# Patient Record
Sex: FEMALE | Race: WHITE | Employment: UNEMPLOYED | URBAN - METROPOLITAN AREA
[De-identification: names, ages, dates, MRNs, and addresses within clinical notes are randomized per-mention and may not be internally consistent; named-entity substitution may affect disease eponyms.]

---

## 2017-06-21 RX ORDER — VALSARTAN AND HYDROCHLOROTHIAZIDE 80; 12.5 MG/1; MG/1
TABLET, FILM COATED ORAL
Qty: 90 TABLET | Refills: 0 | Status: SHIPPED | OUTPATIENT
Start: 2017-06-21 | End: 2017-08-28 | Stop reason: SDUPTHER

## 2017-08-28 ENCOUNTER — OFFICE VISIT (OUTPATIENT)
Dept: FAMILY MEDICINE CLINIC | Age: 52
End: 2017-08-28

## 2017-08-28 VITALS
DIASTOLIC BLOOD PRESSURE: 85 MMHG | HEIGHT: 67 IN | BODY MASS INDEX: 33.59 KG/M2 | HEART RATE: 70 BPM | OXYGEN SATURATION: 98 % | WEIGHT: 214 LBS | SYSTOLIC BLOOD PRESSURE: 130 MMHG

## 2017-08-28 DIAGNOSIS — Z13.29 THYROID DISORDER SCREEN: ICD-10-CM

## 2017-08-28 DIAGNOSIS — I10 ESSENTIAL HYPERTENSION: ICD-10-CM

## 2017-08-28 DIAGNOSIS — K63.5 POLYP OF COLON, UNSPECIFIED PART OF COLON, UNSPECIFIED TYPE: ICD-10-CM

## 2017-08-28 DIAGNOSIS — Z00.00 ANNUAL PHYSICAL EXAM: Primary | ICD-10-CM

## 2017-08-28 LAB
A/G RATIO: 2.1 (ref 1.1–2.2)
ALBUMIN SERPL-MCNC: 4.5 G/DL (ref 3.4–5)
ALP BLD-CCNC: 53 U/L (ref 40–129)
ALT SERPL-CCNC: 15 U/L (ref 10–40)
ANION GAP SERPL CALCULATED.3IONS-SCNC: 16 MMOL/L (ref 3–16)
AST SERPL-CCNC: 14 U/L (ref 15–37)
BASOPHILS ABSOLUTE: 0.1 K/UL (ref 0–0.2)
BASOPHILS RELATIVE PERCENT: 1 %
BILIRUB SERPL-MCNC: 0.5 MG/DL (ref 0–1)
BUN BLDV-MCNC: 11 MG/DL (ref 7–20)
CALCIUM SERPL-MCNC: 9.7 MG/DL (ref 8.3–10.6)
CHLORIDE BLD-SCNC: 101 MMOL/L (ref 99–110)
CHOLESTEROL, TOTAL: 196 MG/DL (ref 0–199)
CO2: 26 MMOL/L (ref 21–32)
CREAT SERPL-MCNC: 0.5 MG/DL (ref 0.6–1.1)
EOSINOPHILS ABSOLUTE: 0.3 K/UL (ref 0–0.6)
EOSINOPHILS RELATIVE PERCENT: 5.8 %
GFR AFRICAN AMERICAN: >60
GFR NON-AFRICAN AMERICAN: >60
GLOBULIN: 2.1 G/DL
GLUCOSE BLD-MCNC: 89 MG/DL (ref 70–99)
HCT VFR BLD CALC: 43.7 % (ref 36–48)
HDLC SERPL-MCNC: 57 MG/DL (ref 40–60)
HEMOGLOBIN: 14.6 G/DL (ref 12–16)
LDL CHOLESTEROL CALCULATED: 113 MG/DL
LYMPHOCYTES ABSOLUTE: 2.2 K/UL (ref 1–5.1)
LYMPHOCYTES RELATIVE PERCENT: 37.6 %
MCH RBC QN AUTO: 29.5 PG (ref 26–34)
MCHC RBC AUTO-ENTMCNC: 33.5 G/DL (ref 31–36)
MCV RBC AUTO: 88.1 FL (ref 80–100)
MONOCYTES ABSOLUTE: 0.3 K/UL (ref 0–1.3)
MONOCYTES RELATIVE PERCENT: 6 %
NEUTROPHILS ABSOLUTE: 2.9 K/UL (ref 1.7–7.7)
NEUTROPHILS RELATIVE PERCENT: 49.6 %
PDW BLD-RTO: 13.7 % (ref 12.4–15.4)
PLATELET # BLD: 282 K/UL (ref 135–450)
PMV BLD AUTO: 8.6 FL (ref 5–10.5)
POTASSIUM SERPL-SCNC: 4.2 MMOL/L (ref 3.5–5.1)
RBC # BLD: 4.96 M/UL (ref 4–5.2)
SODIUM BLD-SCNC: 143 MMOL/L (ref 136–145)
TOTAL PROTEIN: 6.6 G/DL (ref 6.4–8.2)
TRIGL SERPL-MCNC: 131 MG/DL (ref 0–150)
TSH SERPL DL<=0.05 MIU/L-ACNC: 0.54 UIU/ML (ref 0.27–4.2)
VLDLC SERPL CALC-MCNC: 26 MG/DL
WBC # BLD: 5.8 K/UL (ref 4–11)

## 2017-08-28 PROCEDURE — 36415 COLL VENOUS BLD VENIPUNCTURE: CPT | Performed by: FAMILY MEDICINE

## 2017-08-28 PROCEDURE — 99396 PREV VISIT EST AGE 40-64: CPT | Performed by: FAMILY MEDICINE

## 2017-08-28 RX ORDER — VALSARTAN AND HYDROCHLOROTHIAZIDE 80; 12.5 MG/1; MG/1
TABLET, FILM COATED ORAL
Qty: 90 TABLET | Refills: 3 | Status: SHIPPED | OUTPATIENT
Start: 2017-08-28 | End: 2018-09-07 | Stop reason: SDUPTHER

## 2017-08-28 ASSESSMENT — ENCOUNTER SYMPTOMS
GASTROINTESTINAL NEGATIVE: 1
RESPIRATORY NEGATIVE: 1

## 2017-11-28 ENCOUNTER — HOSPITAL ENCOUNTER (OUTPATIENT)
Dept: MAMMOGRAPHY | Age: 52
Discharge: HOME | End: 2017-11-28
Attending: OBSTETRICS & GYNECOLOGY | Admitting: OBSTETRICS & GYNECOLOGY

## 2017-11-28 DIAGNOSIS — Z12.31 ENCOUNTER FOR SCREENING MAMMOGRAM FOR BREAST CANCER: ICD-10-CM

## 2017-11-30 ENCOUNTER — OFFICE VISIT (OUTPATIENT)
Dept: FAMILY MEDICINE CLINIC | Age: 52
End: 2017-11-30

## 2017-11-30 VITALS
HEIGHT: 65 IN | SYSTOLIC BLOOD PRESSURE: 135 MMHG | OXYGEN SATURATION: 98 % | TEMPERATURE: 97.8 F | DIASTOLIC BLOOD PRESSURE: 85 MMHG | BODY MASS INDEX: 36.15 KG/M2 | HEART RATE: 76 BPM | WEIGHT: 217 LBS

## 2017-11-30 DIAGNOSIS — J01.90 ACUTE NON-RECURRENT SINUSITIS, UNSPECIFIED LOCATION: Primary | ICD-10-CM

## 2017-11-30 DIAGNOSIS — H69.82 ETD (EUSTACHIAN TUBE DYSFUNCTION), LEFT: ICD-10-CM

## 2017-11-30 PROCEDURE — 99213 OFFICE O/P EST LOW 20 MIN: CPT | Performed by: FAMILY MEDICINE

## 2017-11-30 RX ORDER — AMOXICILLIN 500 MG/1
500 CAPSULE ORAL 3 TIMES DAILY
Qty: 30 CAPSULE | Refills: 0 | Status: SHIPPED | OUTPATIENT
Start: 2017-11-30 | End: 2017-12-10

## 2017-11-30 ASSESSMENT — ENCOUNTER SYMPTOMS
NAUSEA: 0
CHEST TIGHTNESS: 1
COUGH: 1
VOMITING: 0
DIARRHEA: 0

## 2017-11-30 ASSESSMENT — PATIENT HEALTH QUESTIONNAIRE - PHQ9
1. LITTLE INTEREST OR PLEASURE IN DOING THINGS: 0
2. FEELING DOWN, DEPRESSED OR HOPELESS: 0
SUM OF ALL RESPONSES TO PHQ9 QUESTIONS 1 & 2: 0
SUM OF ALL RESPONSES TO PHQ QUESTIONS 1-9: 0

## 2018-02-28 ENCOUNTER — OFFICE VISIT (OUTPATIENT)
Dept: FAMILY MEDICINE CLINIC | Age: 53
End: 2018-02-28

## 2018-02-28 VITALS
HEART RATE: 82 BPM | SYSTOLIC BLOOD PRESSURE: 134 MMHG | BODY MASS INDEX: 36.49 KG/M2 | OXYGEN SATURATION: 97 % | HEIGHT: 65 IN | DIASTOLIC BLOOD PRESSURE: 86 MMHG | WEIGHT: 219 LBS

## 2018-02-28 DIAGNOSIS — R22.1 LOCALIZED SWELLING, MASS OR LUMP OF NECK: Primary | ICD-10-CM

## 2018-02-28 DIAGNOSIS — L21.9 SEBORRHEIC DERMATITIS OF SCALP: ICD-10-CM

## 2018-02-28 PROCEDURE — 99213 OFFICE O/P EST LOW 20 MIN: CPT | Performed by: FAMILY MEDICINE

## 2018-02-28 RX ORDER — DOXYCYCLINE HYCLATE 100 MG
100 TABLET ORAL 2 TIMES DAILY
Qty: 20 TABLET | Refills: 0 | Status: SHIPPED | OUTPATIENT
Start: 2018-02-28 | End: 2018-03-10

## 2018-02-28 ASSESSMENT — ENCOUNTER SYMPTOMS: RESPIRATORY NEGATIVE: 1

## 2018-02-28 NOTE — PATIENT INSTRUCTIONS
Deepa Brain was seen today for mass.     Diagnoses and all orders for this visit:    Localized swelling, mass or lump of neck  Rx Doxycycline x 1 week-call if not resolved

## 2018-03-05 ENCOUNTER — OFFICE VISIT (OUTPATIENT)
Dept: DERMATOLOGY | Age: 53
End: 2018-03-05

## 2018-03-05 DIAGNOSIS — L21.9 SEBORRHEIC DERMATITIS OF SCALP: Primary | ICD-10-CM

## 2018-03-05 PROCEDURE — 99242 OFF/OP CONSLTJ NEW/EST SF 20: CPT | Performed by: DERMATOLOGY

## 2018-03-05 RX ORDER — KETOCONAZOLE 20 MG/ML
SHAMPOO TOPICAL
Qty: 1 BOTTLE | Refills: 5 | Status: SHIPPED | OUTPATIENT
Start: 2018-03-05 | End: 2019-04-01 | Stop reason: SDUPTHER

## 2018-03-05 RX ORDER — FLUOCINONIDE TOPICAL SOLUTION USP, 0.05% 0.5 MG/ML
SOLUTION TOPICAL
Qty: 60 ML | Refills: 2 | Status: SHIPPED | OUTPATIENT
Start: 2018-03-05 | End: 2019-06-17 | Stop reason: SDUPTHER

## 2018-03-05 NOTE — PROGRESS NOTES
[facility]) Dermatology  Salud Zelaya [address]      [address]  1965    46 y.o. female     Date of Visit: 3/5/2018    Chief Complaint / Reason for Referral: Rash    I was asked to see this patient by Dr. Nai Du. History of Present Illness:  1. Few yr hx recurrent intermittent severely pruritic eruption of scalp. Denies associated flaking. Denies similar symptoms on eyebrows, around nose, elbows/knees.   -No improvement w/ OTC anti-dandruff shampoos, tea tree oil, HC 2.5% crm.   -Occasionally experiences pruritus of ears.   -Hx of dermatitis affecting hands - has not been active in many years     Review of Systems:  Constitutional: Reports general sense of well-being. Past Medical History, Surgical History, Family History, Medications and Allergies reviewed. Past Medical History:   Diagnosis Date    Hypertension     Nausea & vomiting      Past Surgical History:   Procedure Laterality Date    COLONOSCOPY  1/11/2012    hyperpl    FOOT SURGERY      HYSTERECTOMY      OTHER SURGICAL HISTORY  10/31/11    EXPLORATORY LAPAROTOMY,lysis of adhesions, EXCISION OF PELVIC       Allergies   Allergen Reactions    Cephalexin Nausea And Vomiting    Cephalosporins Nausea And Vomiting    Clindamycin/Lincomycin Nausea And Vomiting    Neosporin [Bacitracin-Neomycin-Polymyxin] Rash    Percocet [Oxycodone-Acetaminophen] Nausea And Vomiting     Outpatient Prescriptions Marked as Taking for the 3/5/18 encounter (Office Visit) with Salud Zelaya MD   Medication Sig Dispense Refill    valsartan-hydrochlorothiazide (DIOVAN-HCT) 80-12.5 MG per tablet TAKE 1 TABLET DAILY 90 tablet 3    Cholecalciferol (VITAMIN D3) 5000 UNITS CAPS Take 1 capsule by mouth daily         Physical Examination     The following were examined and determined to be normal: Psych/Neuro, Head/face, Conjunctivae/eyelids, Gums/teeth/lips, RUE and LUE.     The following were examined and determined to be abnormal: Scalp/hair.     -General: Well-appearing, NAD  1. Crown/vertex scalp - ill-defined mildly scaly focally excoriated/crusted erythematous patches   -EACs, eyebrows, NL folds, elbows - clear     Assessment and Plan     1. Recurrent pruritic eruption of scalp, consistent w/ seborrheic dermatitis  -Ketoconazole shampoo TIW as maintenance  -Lidex soln qd prn flares. Edu re: sparing use, atrophy, striae, hypopigmentation, telangiectasias.

## 2018-03-07 ENCOUNTER — TELEPHONE (OUTPATIENT)
Dept: FAMILY MEDICINE CLINIC | Age: 53
End: 2018-03-07

## 2018-03-07 DIAGNOSIS — R59.0 REACTIVE CERVICAL NODES: Primary | ICD-10-CM

## 2018-03-09 ENCOUNTER — HOSPITAL ENCOUNTER (OUTPATIENT)
Dept: CT IMAGING | Age: 53
Discharge: HOME | End: 2018-03-09
Attending: FAMILY MEDICINE | Admitting: FAMILY MEDICINE

## 2018-03-09 DIAGNOSIS — R59.0 REACTIVE CERVICAL NODES: ICD-10-CM

## 2018-03-09 DIAGNOSIS — R59.0 LOCALIZED ENLARGED LYMPH NODES: ICD-10-CM

## 2018-05-08 ENCOUNTER — OFFICE VISIT (OUTPATIENT)
Dept: FAMILY MEDICINE CLINIC | Age: 53
End: 2018-05-08

## 2018-05-08 VITALS
BODY MASS INDEX: 36.49 KG/M2 | OXYGEN SATURATION: 97 % | WEIGHT: 219 LBS | HEIGHT: 65 IN | RESPIRATION RATE: 18 BRPM | SYSTOLIC BLOOD PRESSURE: 126 MMHG | TEMPERATURE: 97.9 F | DIASTOLIC BLOOD PRESSURE: 88 MMHG | HEART RATE: 75 BPM

## 2018-05-08 DIAGNOSIS — Z00.00 ANNUAL PHYSICAL EXAM: Primary | ICD-10-CM

## 2018-05-08 PROCEDURE — 99396 PREV VISIT EST AGE 40-64: CPT | Performed by: NURSE PRACTITIONER

## 2018-05-08 ASSESSMENT — ENCOUNTER SYMPTOMS
BACK PAIN: 1
HEARTBURN: 1

## 2018-05-23 ENCOUNTER — NURSE ONLY (OUTPATIENT)
Dept: FAMILY MEDICINE CLINIC | Age: 53
End: 2018-05-23

## 2018-05-23 DIAGNOSIS — Z00.00 ANNUAL PHYSICAL EXAM: ICD-10-CM

## 2018-05-23 DIAGNOSIS — Z00.00 ANNUAL PHYSICAL EXAM: Primary | ICD-10-CM

## 2018-05-23 LAB
A/G RATIO: 2.1 (ref 1.1–2.2)
ALBUMIN SERPL-MCNC: 4.6 G/DL (ref 3.4–5)
ALP BLD-CCNC: 49 U/L (ref 40–129)
ALT SERPL-CCNC: 17 U/L (ref 10–40)
ANION GAP SERPL CALCULATED.3IONS-SCNC: 15 MMOL/L (ref 3–16)
AST SERPL-CCNC: 15 U/L (ref 15–37)
BASOPHILS ABSOLUTE: 0.1 K/UL (ref 0–0.2)
BASOPHILS RELATIVE PERCENT: 1.6 %
BILIRUB SERPL-MCNC: 0.5 MG/DL (ref 0–1)
BUN BLDV-MCNC: 10 MG/DL (ref 7–20)
CALCIUM SERPL-MCNC: 9.4 MG/DL (ref 8.3–10.6)
CHLORIDE BLD-SCNC: 102 MMOL/L (ref 99–110)
CHOLESTEROL, TOTAL: 179 MG/DL (ref 0–199)
CO2: 26 MMOL/L (ref 21–32)
CREAT SERPL-MCNC: <0.5 MG/DL (ref 0.6–1.1)
EOSINOPHILS ABSOLUTE: 0.2 K/UL (ref 0–0.6)
EOSINOPHILS RELATIVE PERCENT: 5 %
GFR AFRICAN AMERICAN: >60
GFR NON-AFRICAN AMERICAN: >60
GLOBULIN: 2.2 G/DL
GLUCOSE BLD-MCNC: 100 MG/DL (ref 70–99)
HCT VFR BLD CALC: 42.3 % (ref 36–48)
HDLC SERPL-MCNC: 53 MG/DL (ref 40–60)
HEMOGLOBIN: 14.4 G/DL (ref 12–16)
LDL CHOLESTEROL CALCULATED: 101 MG/DL
LYMPHOCYTES ABSOLUTE: 1.8 K/UL (ref 1–5.1)
LYMPHOCYTES RELATIVE PERCENT: 37.5 %
MCH RBC QN AUTO: 30 PG (ref 26–34)
MCHC RBC AUTO-ENTMCNC: 34 G/DL (ref 31–36)
MCV RBC AUTO: 88.3 FL (ref 80–100)
MONOCYTES ABSOLUTE: 0.4 K/UL (ref 0–1.3)
MONOCYTES RELATIVE PERCENT: 7.8 %
NEUTROPHILS ABSOLUTE: 2.3 K/UL (ref 1.7–7.7)
NEUTROPHILS RELATIVE PERCENT: 48.1 %
PDW BLD-RTO: 13.6 % (ref 12.4–15.4)
PLATELET # BLD: 268 K/UL (ref 135–450)
PMV BLD AUTO: 8.2 FL (ref 5–10.5)
POTASSIUM SERPL-SCNC: 4.2 MMOL/L (ref 3.5–5.1)
RBC # BLD: 4.79 M/UL (ref 4–5.2)
SODIUM BLD-SCNC: 143 MMOL/L (ref 136–145)
TOTAL PROTEIN: 6.8 G/DL (ref 6.4–8.2)
TRIGL SERPL-MCNC: 127 MG/DL (ref 0–150)
TSH SERPL DL<=0.05 MIU/L-ACNC: 0.56 UIU/ML (ref 0.27–4.2)
VLDLC SERPL CALC-MCNC: 25 MG/DL
WBC # BLD: 4.8 K/UL (ref 4–11)

## 2018-05-23 PROCEDURE — 36415 COLL VENOUS BLD VENIPUNCTURE: CPT | Performed by: NURSE PRACTITIONER

## 2018-08-10 ENCOUNTER — OFFICE VISIT (OUTPATIENT)
Dept: FAMILY MEDICINE CLINIC | Age: 53
End: 2018-08-10

## 2018-08-10 VITALS
OXYGEN SATURATION: 97 % | BODY MASS INDEX: 35.28 KG/M2 | HEART RATE: 84 BPM | SYSTOLIC BLOOD PRESSURE: 128 MMHG | DIASTOLIC BLOOD PRESSURE: 88 MMHG | WEIGHT: 212 LBS

## 2018-08-10 DIAGNOSIS — Z11.59 NEED FOR HEPATITIS C SCREENING TEST: ICD-10-CM

## 2018-08-10 DIAGNOSIS — I10 ESSENTIAL HYPERTENSION: Primary | ICD-10-CM

## 2018-08-10 DIAGNOSIS — E55.9 VITAMIN D DEFICIENCY: ICD-10-CM

## 2018-08-10 LAB
HEPATITIS C ANTIBODY INTERPRETATION: NORMAL
VITAMIN D 25-HYDROXY: 73.7 NG/ML

## 2018-08-10 PROCEDURE — 99214 OFFICE O/P EST MOD 30 MIN: CPT | Performed by: FAMILY MEDICINE

## 2018-08-10 PROCEDURE — 36415 COLL VENOUS BLD VENIPUNCTURE: CPT | Performed by: FAMILY MEDICINE

## 2018-08-10 NOTE — PROGRESS NOTES
Chief Complaint   Patient presents with    New Patient     establishing         HPI      46 y.o. female presents today to establish care. Previous patient of Dr. Fuentes Gardner. She is feeling well and has no acute complaints. Hx of HTN on diovan 80-12.5. Reports compliance without medication SE. BP today 128/88. Had hysterectomy 2/2 menorrhagia. Follows with Dr. Kenneth Hong. Gets pap smears annual. Years ago had pelvic mass which was a benign ovarian mass  Patient Active Problem List   Diagnosis    Essential hypertension    Patient overweight    History of colonic diverticulitis    OA (osteoarthritis) of hip    DDD (degenerative disc disease), lumbar    Tendinitis in region of greater trochanter of left femur    Vitamin D deficiency    Eczema    Seborrheic dermatitis of scalp    Polyp of colon    Localized swelling, mass or lump of neck     Past Medical History:   Diagnosis Date    Hypertension     Low back pain     Nausea & vomiting     Ovarian tumor (benign)        Past Surgical History:   Procedure Laterality Date    BREAST LUMPECTOMY Left 1988    COLONOSCOPY  1/11/2012    hyperpl    CYSTOSCOPY  2002/2003    FOOT SURGERY      HYSTERECTOMY      HYSTERECTOMY, TOTAL ABDOMINAL      OTHER SURGICAL HISTORY  10/31/11    EXPLORATORY LAPAROTOMY,lysis of adhesions, EXCISION OF PELVIC     Most Recent Immunizations   Administered Date(s) Administered    Td 01/01/2003    Tdap (Boostrix, Adacel) 06/29/2009        Current Outpatient Prescriptions   Medication Sig Dispense Refill    Multiple Vitamins-Minerals (MULTIVITAMIN ADULT PO) Take by mouth      fluocinonide (LIDEX) 0.05 % external solution Apply sparingly to affected area(s) of scalp qd prn flares. 60 mL 2    ketoconazole (NIZORAL) 2 % shampoo Wash scalp 3 times per week.  1 Bottle 5    valsartan-hydrochlorothiazide (DIOVAN-HCT) 80-12.5 MG per tablet TAKE 1 TABLET DAILY 90 tablet 3    Cholecalciferol (VITAMIN D3) 5000 UNITS CAPS Take 1 capsule by mouth Normal rate, regular rhythm and normal heart sounds. Pulmonary/Chest: Effort normal and breath sounds normal. No respiratory distress. She has no wheezes. Neurological: She is alert and oriented to person, place, and time. Skin: Skin is warm and dry. She is not diaphoretic. Psychiatric: She has a normal mood and affect. Vitals reviewed. ASSESSMENT:   Well Adult, See encounter diagnoses  Marimar was seen today for new patient. Diagnoses and all orders for this visit:    Essential hypertension  Notified her of the recall on valsartan. Provided her with a list of the manufactures on the recall and advised to call her pharmacy and see if hers is on their list and then notify me. Vitamin D deficiency  -     Vitamin D 25 Hydroxy    Need for hepatitis C screening test  -     Hepatitis C Antibody      Billing based on time. 30 minutes spent with the patient, and greater than 50% was spent in counseling       Plan:   See orders and medications filed with this encounter. Return in about 6 months (around 2/10/2019) for or sooner if needed.

## 2018-08-12 ASSESSMENT — ENCOUNTER SYMPTOMS: SHORTNESS OF BREATH: 0

## 2018-09-06 ENCOUNTER — PATIENT MESSAGE (OUTPATIENT)
Dept: FAMILY MEDICINE CLINIC | Age: 53
End: 2018-09-06

## 2018-09-07 RX ORDER — VALSARTAN AND HYDROCHLOROTHIAZIDE 80; 12.5 MG/1; MG/1
TABLET, FILM COATED ORAL
Qty: 90 TABLET | Refills: 3 | Status: SHIPPED | OUTPATIENT
Start: 2018-09-07 | End: 2018-12-17 | Stop reason: SDUPTHER

## 2018-10-22 ENCOUNTER — OFFICE VISIT (OUTPATIENT)
Dept: DERMATOLOGY | Age: 53
End: 2018-10-22
Payer: OTHER GOVERNMENT

## 2018-10-22 DIAGNOSIS — L21.9 SEBORRHEIC DERMATITIS OF SCALP: Primary | ICD-10-CM

## 2018-10-22 DIAGNOSIS — D23.62: ICD-10-CM

## 2018-10-22 PROCEDURE — 99213 OFFICE O/P EST LOW 20 MIN: CPT | Performed by: DERMATOLOGY

## 2018-12-03 ENCOUNTER — HOSPITAL ENCOUNTER (OUTPATIENT)
Dept: WOMENS IMAGING | Age: 53
Discharge: HOME | End: 2018-12-03
Payer: OTHER GOVERNMENT

## 2018-12-03 DIAGNOSIS — Z12.31 ENCOUNTER FOR SCREENING MAMMOGRAM FOR BREAST CANCER: ICD-10-CM

## 2018-12-03 PROCEDURE — 77067 SCR MAMMO BI INCL CAD: CPT

## 2018-12-17 RX ORDER — VALSARTAN AND HYDROCHLOROTHIAZIDE 80; 12.5 MG/1; MG/1
TABLET, FILM COATED ORAL
Qty: 90 TABLET | Refills: 0 | Status: SHIPPED | OUTPATIENT
Start: 2018-12-17 | End: 2019-09-09 | Stop reason: SDUPTHER

## 2018-12-17 NOTE — TELEPHONE ENCOUNTER
From: Yuliana Thurman  Sent: 12/15/2018 11:37 AM EST  Subject: Medication Renewal Request    Yuliana Thurman would like a refill of the following medications:     valsartan-hydrochlorothiazide (DIOVAN-HCT) 80-12.5 MG per tablet Hiral Aden MD]   Patient Comment: My prescription from "[employer] is on a recall. Could you send a 30 day supply prescription to [facility] until [facility] presript[facility] arrives [facility].     Preferred pharmacy: [facility] [address] - P [phone] - F [phone]

## 2019-08-12 ENCOUNTER — OFFICE VISIT (OUTPATIENT)
Dept: FAMILY MEDICINE CLINIC | Age: 54
End: 2019-08-12
Payer: OTHER GOVERNMENT

## 2019-08-12 VITALS
RESPIRATION RATE: 16 BRPM | HEIGHT: 65 IN | OXYGEN SATURATION: 94 % | DIASTOLIC BLOOD PRESSURE: 86 MMHG | HEART RATE: 94 BPM | WEIGHT: 212 LBS | SYSTOLIC BLOOD PRESSURE: 126 MMHG | BODY MASS INDEX: 35.32 KG/M2

## 2019-08-12 DIAGNOSIS — L30.9 DERMATITIS: Primary | ICD-10-CM

## 2019-08-12 DIAGNOSIS — Z23 NEED FOR VACCINATION: ICD-10-CM

## 2019-08-12 PROCEDURE — 99213 OFFICE O/P EST LOW 20 MIN: CPT | Performed by: NURSE PRACTITIONER

## 2019-08-12 PROCEDURE — 90471 IMMUNIZATION ADMIN: CPT | Performed by: NURSE PRACTITIONER

## 2019-08-12 PROCEDURE — 90715 TDAP VACCINE 7 YRS/> IM: CPT | Performed by: NURSE PRACTITIONER

## 2019-08-12 ASSESSMENT — ENCOUNTER SYMPTOMS
NAUSEA: 0
COUGH: 0
DIARRHEA: 0
ABDOMINAL PAIN: 0
SHORTNESS OF BREATH: 0
RHINORRHEA: 0

## 2019-08-12 ASSESSMENT — PATIENT HEALTH QUESTIONNAIRE - PHQ9
SUM OF ALL RESPONSES TO PHQ QUESTIONS 1-9: 0
2. FEELING DOWN, DEPRESSED OR HOPELESS: 0
SUM OF ALL RESPONSES TO PHQ QUESTIONS 1-9: 0
DEPRESSION UNABLE TO ASSESS: PT REFUSES
1. LITTLE INTEREST OR PLEASURE IN DOING THINGS: 0
SUM OF ALL RESPONSES TO PHQ9 QUESTIONS 1 & 2: 0

## 2019-09-09 ENCOUNTER — OFFICE VISIT (OUTPATIENT)
Dept: FAMILY MEDICINE CLINIC | Age: 54
End: 2019-09-09
Payer: OTHER GOVERNMENT

## 2019-09-09 VITALS
TEMPERATURE: 97.7 F | HEART RATE: 73 BPM | DIASTOLIC BLOOD PRESSURE: 64 MMHG | BODY MASS INDEX: 35.85 KG/M2 | OXYGEN SATURATION: 96 % | HEIGHT: 65 IN | WEIGHT: 215.2 LBS | SYSTOLIC BLOOD PRESSURE: 122 MMHG

## 2019-09-09 DIAGNOSIS — I10 ESSENTIAL HYPERTENSION: ICD-10-CM

## 2019-09-09 DIAGNOSIS — Z00.00 ANNUAL PHYSICAL EXAM: Primary | ICD-10-CM

## 2019-09-09 LAB
A/G RATIO: 2.4 (ref 1.1–2.2)
ALBUMIN SERPL-MCNC: 4.7 G/DL (ref 3.4–5)
ALP BLD-CCNC: 49 U/L (ref 40–129)
ALT SERPL-CCNC: 21 U/L (ref 10–40)
ANION GAP SERPL CALCULATED.3IONS-SCNC: 19 MMOL/L (ref 3–16)
AST SERPL-CCNC: 17 U/L (ref 15–37)
BILIRUB SERPL-MCNC: 0.5 MG/DL (ref 0–1)
BUN BLDV-MCNC: 12 MG/DL (ref 7–20)
CALCIUM SERPL-MCNC: 9.8 MG/DL (ref 8.3–10.6)
CHLORIDE BLD-SCNC: 101 MMOL/L (ref 99–110)
CHOLESTEROL, TOTAL: 198 MG/DL (ref 0–199)
CO2: 23 MMOL/L (ref 21–32)
CREAT SERPL-MCNC: 0.5 MG/DL (ref 0.6–1.1)
GFR AFRICAN AMERICAN: >60
GFR NON-AFRICAN AMERICAN: >60
GLOBULIN: 2 G/DL
GLUCOSE BLD-MCNC: 96 MG/DL (ref 70–99)
HDLC SERPL-MCNC: 51 MG/DL (ref 40–60)
LDL CHOLESTEROL CALCULATED: 114 MG/DL
POTASSIUM SERPL-SCNC: 4 MMOL/L (ref 3.5–5.1)
SODIUM BLD-SCNC: 143 MMOL/L (ref 136–145)
TOTAL PROTEIN: 6.7 G/DL (ref 6.4–8.2)
TRIGL SERPL-MCNC: 166 MG/DL (ref 0–150)
VLDLC SERPL CALC-MCNC: 33 MG/DL

## 2019-09-09 PROCEDURE — 99396 PREV VISIT EST AGE 40-64: CPT | Performed by: FAMILY MEDICINE

## 2019-09-09 PROCEDURE — 36415 COLL VENOUS BLD VENIPUNCTURE: CPT | Performed by: FAMILY MEDICINE

## 2019-09-09 RX ORDER — VALSARTAN AND HYDROCHLOROTHIAZIDE 80; 12.5 MG/1; MG/1
TABLET, FILM COATED ORAL
Qty: 90 TABLET | Refills: 1 | Status: SHIPPED | OUTPATIENT
Start: 2019-09-09 | End: 2020-03-09

## 2019-09-09 ASSESSMENT — ENCOUNTER SYMPTOMS
SHORTNESS OF BREATH: 0
VOMITING: 0
ABDOMINAL PAIN: 0
NAUSEA: 0
CONSTIPATION: 0
DIARRHEA: 0

## 2019-09-09 NOTE — PROGRESS NOTES
(DIOVAN-HCT) 80-12.5 MG per tablet; TAKE 1 TABLET DAILY          Plan:   See orders and medications filed with this encounter. The patient is advised to return for routine annual checkups. Encouraged healthy diet, regular exercise and multivitamin daily. Labs checked per orders. Optho visit q 1-2 years. Watch for skin mole changes. Colonoscopy if over age 48 or if family history was discussed. Vaccines ordered today per orders. Return in about 6 months (around 3/9/2020) for hypertension.

## 2019-09-09 NOTE — PATIENT INSTRUCTIONS
Patient Education        Well Visit, Women 48 to 72: Care Instructions  Your Care Instructions    Physical exams can help you stay healthy. Your doctor has checked your overall health and may have suggested ways to take good care of yourself. He or she also may have recommended tests. At home, you can help prevent illness with healthy eating, regular exercise, and other steps. Follow-up care is a key part of your treatment and safety. Be sure to make and go to all appointments, and call your doctor if you are having problems. It's also a good idea to know your test results and keep a list of the medicines you take. How can you care for yourself at home? · Reach and stay at a healthy weight. This will lower your risk for many problems, such as obesity, diabetes, heart disease, and high blood pressure. · Get at least 30 minutes of exercise on most days of the week. Walking is a good choice. You also may want to do other activities, such as running, swimming, cycling, or playing tennis or team sports. · Do not smoke. Smoking can make health problems worse. If you need help quitting, talk to your doctor about stop-smoking programs and medicines. These can increase your chances of quitting for good. · Protect your skin from too much sun. When you're outdoors from 10 a.m. to 4 p.m., stay in the shade or cover up with clothing and a hat with a wide brim. Wear sunglasses that block UV rays. Even when it's cloudy, put broad-spectrum sunscreen (SPF 30 or higher) on any exposed skin. · See a dentist one or two times a year for checkups and to have your teeth cleaned. · Wear a seat belt in the car. Follow your doctor's advice about when to have certain tests. These tests can spot problems early. · Cholesterol. Your doctor will tell you how often to have this done based on your age, family history, or other things that can increase your risk for heart attack and stroke. · Blood pressure.  Have your blood pressure you call for help? Watch closely for changes in your health, and be sure to contact your doctor if you have any problems or symptoms that concern you. Where can you learn more? Go to https://[employer]. org and sign in to your [employer] account. Enter L732 in the [employer] box to learn more about \"Well Visit, Women 50 to 72: Care Instructions. \"     If you do not have an account, please click on the \"Sign Up Now\" link. Current as of: December 13, 2018  Content Version: 12.1  © 2077-8127 Healthwise, Incorporated. Care instructions adapted under license by [facility] ([facility]). If you have questions about a medical condition or this instruction, always ask your healthcare professional. [address] any warranty or liability for your use of this information.

## 2019-10-10 ENCOUNTER — TELEPHONE (OUTPATIENT)
Dept: DERMATOLOGY | Age: 54
End: 2019-10-10

## 2020-01-13 ENCOUNTER — HOSPITAL ENCOUNTER (OUTPATIENT)
Dept: WOMENS IMAGING | Age: 55
Discharge: HOME | End: 2020-01-13
Payer: OTHER GOVERNMENT

## 2020-01-13 ENCOUNTER — TELEPHONE (OUTPATIENT)
Dept: WOMENS IMAGING | Age: 55
End: 2020-01-13

## 2020-01-13 ENCOUNTER — OFFICE VISIT (OUTPATIENT)
Dept: ORTHOPEDIC SURGERY | Age: 55
End: 2020-01-13
Payer: OTHER GOVERNMENT

## 2020-01-13 VITALS
WEIGHT: 215.17 LBS | HEIGHT: 65 IN | DIASTOLIC BLOOD PRESSURE: 80 MMHG | SYSTOLIC BLOOD PRESSURE: 122 MMHG | BODY MASS INDEX: 35.85 KG/M2 | HEART RATE: 72 BPM

## 2020-01-13 PROCEDURE — 99203 OFFICE O/P NEW LOW 30 MIN: CPT | Performed by: PHYSICAL MEDICINE & REHABILITATION

## 2020-01-13 PROCEDURE — 77063 BREAST TOMOSYNTHESIS BI: CPT

## 2020-01-13 NOTE — PROGRESS NOTES
New Patient: SPINE    CHIEF COMPLAINT:    Chief Complaint   Patient presents with    Neck Pain     neck pain x2 years getting worse going into lt shoulder       HISTORY OF PRESENT ILLNESS:                The patient is a 47 y.o. female self-referred for neck pain. I saw her in 2014 for back pain and epidurals with improvement. She reports 2-year history of axial neck pain more on the left side. Worse at night and worse when she turns her head. Some associated occipital headaches. No rating arm pain or numbness. No weakness. She simply want to make sure nothing significant is going on. She reports no recent trauma. No coordination issues  Past Medical History:   Diagnosis Date    Hypertension     Low back pain     Nausea & vomiting     Ovarian tumor (benign)           Pain Assessment  Location of Pain: Neck  Severity of Pain: 4  Quality of Pain: Aching  Duration of Pain: Persistent  Frequency of Pain: Intermittent  Aggravating Factors: Bending, Stretching, Straightening  Limiting Behavior: Yes  Relieving Factors: Rest  Result of Injury: No  Work-Related Injury: No  Are there other pain locations you wish to document?: No    The pain assessment was noted & reviewed in the medical record today.      Current/Past Treatment: None  · Physical Therapy:   · Chiropractic:     · Injection:     Medications:            NSAIDS:             Muscle relaxer:              Steriods:              Neuropathic medications:              Opioids:            Other:   · Surgery/Consult:    Work Status/Functionality:     Past Medical History: Medical history form was reviewed today & scanned into the media tab  Past Medical History:   Diagnosis Date    Hypertension     Low back pain     Nausea & vomiting     Ovarian tumor (benign)       Past Surgical History:     Past Surgical History:   Procedure Laterality Date    BREAST LUMPECTOMY Left 1988    COLONOSCOPY  1/11/2012    hyperpl    CYSTOSCOPY  2002/2003    FOOT SURGERY  HYSTERECTOMY      HYSTERECTOMY, TOTAL ABDOMINAL      OTHER SURGICAL HISTORY  10/31/11    EXPLORATORY LAPAROTOMY,lysis of adhesions, EXCISION OF PELVIC     Current Medications:     Current Outpatient Medications:     valsartan-hydrochlorothiazide (DIOVAN-HCT) 80-12.5 MG per tablet, TAKE 1 TABLET DAILY, Disp: 90 tablet, Rfl: 1    fluocinonide (LIDEX) 0.05 % external solution, APPLY SPARINGLY TO AFFECTED AREAS OF SCALP EVERY DAY AS NEEDED FOR FLARES, Disp: 60 mL, Rfl: 2    ketoconazole (NIZORAL) 2 % shampoo, WASH EXTERNALLY TO THE SCALP 3 TIMES EVERY WEEK, Disp: 120 mL, Rfl: 3    Multiple Vitamins-Minerals (MULTIVITAMIN ADULT PO), Take by mouth, Disp: , Rfl:     Cholecalciferol (VITAMIN D3) 5000 UNITS CAPS, Take 1 capsule by mouth daily, Disp: , Rfl:   Allergies:  Cephalexin; Cephalosporins; Clindamycin/lincomycin; Neosporin [bacitracin-neomycin-polymyxin]; and Percocet [oxycodone-acetaminophen]  Social History:    reports that she has never smoked. She has never used smokeless tobacco. She reports that she does not drink alcohol or use drugs.   Family History:   Family History   Problem Relation Age of Onset    Cancer Mother         Breast    High Blood Pressure Mother     Heart Disease Mother     High Blood Pressure Father     Diabetes Father     High Blood Pressure Sister     High Blood Pressure Brother        REVIEW OF SYSTEMS: Full ROS noted & scanned   CONSTITUTIONAL: Denies unexplained weight loss, fevers, chills or fatigue  NEUROLOGICAL: Denies unsteady gait or progressive weakness  MUSCULOSKELETAL: Denies joint swelling or redness  PSYCHOLOGICAL: Denies anxiety, depression   SKIN: Denies skin changes, delayed healing, rash, itching   HEMATOLOGIC: Denies easy bleeding or bruising  ENDOCRINE: Denies excessive thirst, urination, heat/cold  RESPIRATORY: Denies current dyspnea, cough  GI: Denies nausea, vomiting, diarrhea   : Denies bowel or bladder issues       PHYSICAL EXAM:    Vitals: Blood

## 2020-02-25 ENCOUNTER — OFFICE VISIT (OUTPATIENT)
Dept: DERMATOLOGY | Age: 55
End: 2020-02-25
Payer: OTHER GOVERNMENT

## 2020-02-25 PROCEDURE — 99214 OFFICE O/P EST MOD 30 MIN: CPT | Performed by: DERMATOLOGY

## 2020-02-25 RX ORDER — FLUOCINONIDE 0.5 MG/G
OINTMENT TOPICAL
Qty: 30 G | Refills: 1 | Status: SHIPPED | OUTPATIENT
Start: 2020-02-25 | End: 2022-08-31 | Stop reason: ALTCHOICE

## 2020-02-25 NOTE — PATIENT INSTRUCTIONS
Protecting Yourself From the Sun    · Apply broad spectrum water resistant sunscreen with an SPF of at least 30 to exposed areas of the skin. Dont forget the ears and lips! Remember to reapply sunscreen about every 2 hours and after swimming or sweating. · Wear sun protective clothing. Swim shirts (aka. rash guards) are a great idea and negates the need to reapply sunscreen in those areas. · Seek the shade whenever possible especially between the hours of 10 am and 4 pm when the suns rays are the strongest.     · Avoid tanning beds         DRY SKIN CARE    1. Do not take more than 1 shower or bath each day. Try to spend 10 minutes or less in the shower/bath. 2. Use a moisturizing soap such as Dove, Oil of Olay or Cetaphil. Antibacterial soaps can be too drying. 3. Use soap only on limited areas (face, underarms, groin). Try to avoid using soap on the arms, legs, trunk and back. 4. After showering, pat dry with a towel and generously apply a thick moisturizer all over. 5. If you are able, apply the moisturizer a second time during the day as well. 6. If a prescription cream or ointment has been ordered for you, apply the prescription medication to affected areas after your bath/shower while the skin is still damp, then apply the moisturizer as above. 7. When it comes to moisturizers, the thicker the better. Ointments (such as vaseline) are thicker than creams, and creams are thicker than lotions.  Suggested over-the-counter moisturizers include:    · CeraVe Cream  · Cetaphil Cream  · Lubriderm Cream  · Vaseline (petroleum jelly)  · Aquaphor  · Eucerin Cream  · Neutrogena Moisturizing Cream  · Neutrogena Hand Cream  · Aveeno Moisturizing Cream or Lotion  · Curel Cream

## 2020-03-09 RX ORDER — VALSARTAN AND HYDROCHLOROTHIAZIDE 80; 12.5 MG/1; MG/1
TABLET, FILM COATED ORAL
Qty: 90 TABLET | Refills: 3 | Status: SHIPPED | OUTPATIENT
Start: 2020-03-09 | End: 2020-06-08 | Stop reason: SDUPTHER

## 2020-05-09 RX ORDER — KETOCONAZOLE 20 MG/ML
SHAMPOO TOPICAL
Qty: 120 ML | Refills: 3 | Status: SHIPPED | OUTPATIENT
Start: 2020-05-09 | End: 2021-07-30

## 2020-06-12 ENCOUNTER — VIRTUAL VISIT (OUTPATIENT)
Dept: FAMILY MEDICINE CLINIC | Age: 55
End: 2020-06-12
Payer: OTHER GOVERNMENT

## 2020-06-12 PROCEDURE — 99213 OFFICE O/P EST LOW 20 MIN: CPT | Performed by: FAMILY MEDICINE

## 2020-06-12 ASSESSMENT — PATIENT HEALTH QUESTIONNAIRE - PHQ9
SUM OF ALL RESPONSES TO PHQ QUESTIONS 1-9: 0
SUM OF ALL RESPONSES TO PHQ9 QUESTIONS 1 & 2: 0
SUM OF ALL RESPONSES TO PHQ QUESTIONS 1-9: 0
1. LITTLE INTEREST OR PLEASURE IN DOING THINGS: 0
2. FEELING DOWN, DEPRESSED OR HOPELESS: 0

## 2020-06-12 ASSESSMENT — ENCOUNTER SYMPTOMS: SHORTNESS OF BREATH: 0

## 2020-06-12 NOTE — PROGRESS NOTES
2020    TELEHEALTH EVALUATION -- Audio/Visual (During JOGCQ-85 public health emergency)    HPI:    Gisele Robins (:  1965) has requested an audio/video evaluation for the following concern(s):    She is feeling well and has no acute complaints. Hx of HTN on diovan 80-12.5. Reports compliance without medication SE. monitors blood pressure at home periodically and is usually around 128/78. Review of Systems   Constitutional: Negative for chills and fever. Respiratory: Negative for shortness of breath. Cardiovascular: Negative for chest pain. Prior to Visit Medications    Medication Sig Taking? Authorizing Provider   valsartan-hydrochlorothiazide (DIOVAN-HCT) 80-12.5 MG per tablet TAKE 1 TABLET DAILY Yes Yaya Vo MD   ketoconazole (NIZORAL) 2 % shampoo WASH EXTERNALLY TO THE SCALP 3 TIMES EVERY WEEK Yes Mary Godinez MD   fluocinonide (LIDEX) 0.05 % ointment Apply sparingly to affected area(s) bid prn for flares, up to 2 weeks at a time. Do not apply on cleared skin.  Yes Mary Godinez MD   fluocinonide (LIDEX) 0.05 % external solution APPLY SPARINGLY TO AFFECTED AREAS OF SCALP EVERY DAY AS NEEDED FOR FLARES Yes Ariela Olmos MD   Multiple Vitamins-Minerals (MULTIVITAMIN ADULT PO) Take by mouth Yes Historical Provider, MD   Cholecalciferol (VITAMIN D3) 5000 UNITS CAPS Take 1 capsule by mouth daily Yes Historical Provider, MD       Social History     Tobacco Use    Smoking status: Never Smoker    Smokeless tobacco: Never Used   Substance Use Topics    Alcohol use: No     Alcohol/week: 0.0 standard drinks    Drug use: No        Allergies   Allergen Reactions    Cephalexin Nausea And Vomiting    Cephalosporins Nausea And Vomiting    Clindamycin/Lincomycin Nausea And Vomiting    Neosporin [Bacitracin-Neomycin-Polymyxin] Rash    Percocet [Oxycodone-Acetaminophen] Nausea And Vomiting       PHYSICAL EXAMINATION:  [ INSTRUCTIONS:  \"[x]\" Indicates a positive item  \"[]\" Vitals/Constitutional/EENT/Resp/CV/GI//MS/Neuro/Skin/Heme-Lymph-Imm. Pursuant to the emergency declaration under the [address] [place] [address] and the [employer] and [employer] Act, this Virtual Visit was conducted with patient's (and/or legal guardian's) consent, to reduce the patient's risk of exposure to COVID-19 and provide necessary medical care. The patient (and/or legal guardian) has also been advised to contact this office for worsening conditions or problems, and seek emergency medical treatment and/or call 911 if deemed necessary. Patient identification was verified at the start of the visit: Yes    Total time spent on this encounter: Not billed by time    Services were provided through a video synchronous discussion virtually to substitute for in-person clinic visit. Patient and provider were located at their individual homes. --Pierre Alegria MD on 6/12/2020 at 8:55 AM    An electronic signature was used to authenticate this note.

## 2020-07-17 RX ORDER — VALSARTAN AND HYDROCHLOROTHIAZIDE 80; 12.5 MG/1; MG/1
TABLET, FILM COATED ORAL
Qty: 90 TABLET | Refills: 0 | Status: SHIPPED | OUTPATIENT
Start: 2020-07-17 | End: 2020-07-17

## 2020-07-17 RX ORDER — VALSARTAN AND HYDROCHLOROTHIAZIDE 80; 12.5 MG/1; MG/1
TABLET, FILM COATED ORAL
Qty: 90 TABLET | Refills: 0 | Status: SHIPPED | OUTPATIENT
Start: 2020-07-17 | End: 2020-10-13 | Stop reason: SDUPTHER

## 2020-07-17 NOTE — TELEPHONE ENCOUNTER
Patient states that [facility] are out of this medication and have no idea when its coming in - can this be called into [facility] on [facility] [address] in [place]     Patient would like 90 day since that's what she would get [facility] but realizes that she may have to get 30 day

## 2020-08-25 ENCOUNTER — TELEMEDICINE (OUTPATIENT)
Dept: FAMILY MEDICINE CLINIC | Age: 55
End: 2020-08-25
Payer: OTHER GOVERNMENT

## 2020-08-25 PROCEDURE — 99396 PREV VISIT EST AGE 40-64: CPT | Performed by: FAMILY MEDICINE

## 2020-08-25 NOTE — PROGRESS NOTES
TELEHEALTH EVALUATION -- Audio/Visual (During OHRHL-62 public health emergency)    HPI:  The patient has requested an audio/video evaluation for the following concern(s):  Chief Complaint   Patient presents with    Established New Doctor     New patient    She was seeing a [facility] provider at a different location prior to today. She was being followed for high blood pressure. It has been controlled and she provides her blood pressure readings today. She will be due for labs in about 2 weeks. She has no other complaints.       122/77 today  Pulse 84    Past Medical History:   Diagnosis Date    Hypertension     Low back pain     Nausea & vomiting     Ovarian tumor (benign)      Past Surgical History:   Procedure Laterality Date    BREAST LUMPECTOMY Left 1988    COLONOSCOPY  1/11/2012    hyperpl    CYSTOSCOPY  2002/2003    FOOT SURGERY      HYSTERECTOMY      HYSTERECTOMY, TOTAL ABDOMINAL      OTHER SURGICAL HISTORY  10/31/11    EXPLORATORY LAPAROTOMY,lysis of adhesions, EXCISION OF PELVIC     Family History   Problem Relation Age of Onset    Cancer Mother         Breast    High Blood Pressure Mother     Heart Disease Mother     High Blood Pressure Father     Diabetes Father     High Blood Pressure Sister     High Blood Pressure Brother      Social History     Socioeconomic History    Marital status:      Spouse name: Not on file    Number of children: Not on file    Years of education: Not on file    Highest education level: Not on file   Occupational History    Not on file   Social Needs    Financial resource strain: Not on file    Food insecurity     Worry: Not on file     Inability: Not on file    Transportation needs     Medical: Not on file     Non-medical: Not on file   Tobacco Use    Smoking status: Never Smoker    Smokeless tobacco: Never Used   Substance and Sexual Activity    Alcohol use: No     Alcohol/week: 0.0 standard drinks    Drug use: No    Sexual activity: Yes     Partners: Male     Birth control/protection: Surgical   Lifestyle    Physical activity     Days per week: Not on file     Minutes per session: Not on file    Stress: Not on file   Relationships    Social connections     Talks on phone: Not on file     Gets together: Not on file     Attends Adventism service: Not on file     Active member of club or organization: Not on file     Attends meetings of clubs or organizations: Not on file     Relationship status: Not on file    Intimate partner violence     Fear of current or ex partner: Not on file     Emotionally abused: Not on file     Physically abused: Not on file     Forced sexual activity: Not on file   Other Topics Concern    Not on file   Social History Narrative    Not on file     Allergies   Allergen Reactions    Cephalexin Nausea And Vomiting    Cephalosporins Nausea And Vomiting    Clindamycin/Lincomycin Nausea And Vomiting    Neosporin [Bacitracin-Neomycin-Polymyxin] Rash    Percocet [Oxycodone-Acetaminophen] Nausea And Vomiting     Current Outpatient Medications on File Prior to Visit   Medication Sig Dispense Refill    valsartan-hydrochlorothiazide (DIOVAN-HCT) 80-12.5 MG per tablet TAKE 1 TABLET DAILY 90 tablet 0    ketoconazole (NIZORAL) 2 % shampoo WASH EXTERNALLY TO THE SCALP 3 TIMES EVERY WEEK 120 mL 3    fluocinonide (LIDEX) 0.05 % ointment Apply sparingly to affected area(s) bid prn for flares, up to 2 weeks at a time. Do not apply on cleared skin. 30 g 1    fluocinonide (LIDEX) 0.05 % external solution APPLY SPARINGLY TO AFFECTED AREAS OF SCALP EVERY DAY AS NEEDED FOR FLARES 60 mL 2    Multiple Vitamins-Minerals (MULTIVITAMIN ADULT PO) Take by mouth      Cholecalciferol (VITAMIN D3) 5000 UNITS CAPS Take 1 capsule by mouth daily       No current facility-administered medications on file prior to visit. Review of Systems   Constitutional: Negative. HENT: Negative.     Eyes: Negative for blurred vision, discharge and redness. Respiratory: Negative for chest tightness and shortness of breath. Cardiovascular: Negative for chest pain and palpitations. Gastrointestinal: Negative. Endocrine: Negative. Genitourinary: Negative. Musculoskeletal: Negative. Allergic/Immunologic: Negative for immunocompromised state. Neurological: Negative for dizziness. Psychiatric/Behavioral: Negative for agitation, behavioral problems and confusion. All other systems reviewed and are negative. .      Constitutional: [x] Appears well-developed and well-nourished [x] No apparent distress      [] Abnormal-   Mental status  [x] Alert and awake  [x] Oriented to person/place/time [x]Able to follow commands      Eyes:  EOM    [x]  Normal  [] Abnormal-  Sclera  [x]  Normal  [] Abnormal -         Discharge [x]  None visible  [] Abnormal -    HENT:   [x] Normocephalic, atraumatic. [] Abnormal   [] Mouth/Throat: Mucous membranes are moist.     External Ears [x] Normal  [] Abnormal-     Neck: [x] No visualized mass     Pulmonary/Chest: [x] Respiratory effort normal.  [x] No visualized signs of difficulty breathing or respiratory distress        [] Abnormal-    Able to speak in full sentences without difficulty  Musculoskeletal:   [] Normal gait with no signs of ataxia         [x] Normal range of motion of neck        [] Abnormal-       Neurological:        [x] No Facial Asymmetry (Cranial nerve 7 motor function) (limited exam to video visit)          [x] No gaze palsy        [] Abnormal-         Skin:        [x] No significant exanthematous lesions or discoloration noted on facial skin         [] Abnormal-            Psychiatric:       [x] Normal Affect [] No Hallucinations        [] Abnormal-   Judgment, behavior, thought and mood are normal.     (During ILBCS-02 public health emergency), evaluation of the following organ systems was limited: Vitals/Constitutional/EENT/Resp/CV/GI//MS/Neuro/Skin/Heme-Lymph-Imm.   Pursuant to the emergency declaration under the [facility], [address] [place] [address] waiver authority and the [employer] Act, this Virtual Visit was conducted with patient's (and/or legal guardian's) consent, to reduce the patient's risk of exposure to COVID-19 and provide necessary medical care. The patient (and/or legal guardian) has also been advised to contact this office for worsening conditions or problems, and seek emergency medical treatment and/or call 911 if deemed necessary. Patient initiated the encounter and gave consent for the encounter. Services were provided through a video synchronous discussion virtually to substitute for in-person clinic visit. Patient and provider were located at their individual homes. Diagnosis Orders   1. Annual physical exam  CBC    Comprehensive Metabolic Panel    Lipid Panel     Sandradinh Pastor was seen today for established new doctor. Diagnoses and all orders for this visit:    Annual physical exam  -     CBC; Future  -     Comprehensive Metabolic Panel; Future  -     Lipid Panel;  Future

## 2020-09-23 DIAGNOSIS — Z00.00 ANNUAL PHYSICAL EXAM: ICD-10-CM

## 2020-09-23 LAB
A/G RATIO: 2.5 (ref 1.1–2.2)
ALBUMIN SERPL-MCNC: 4.3 G/DL (ref 3.4–5)
ALP BLD-CCNC: 45 U/L (ref 40–129)
ALT SERPL-CCNC: 13 U/L (ref 10–40)
ANION GAP SERPL CALCULATED.3IONS-SCNC: 9 MMOL/L (ref 3–16)
AST SERPL-CCNC: 14 U/L (ref 15–37)
BILIRUB SERPL-MCNC: 0.4 MG/DL (ref 0–1)
BUN BLDV-MCNC: 11 MG/DL (ref 7–20)
CALCIUM SERPL-MCNC: 9.4 MG/DL (ref 8.3–10.6)
CHLORIDE BLD-SCNC: 104 MMOL/L (ref 99–110)
CHOLESTEROL, TOTAL: 179 MG/DL (ref 0–199)
CO2: 28 MMOL/L (ref 21–32)
CREAT SERPL-MCNC: 0.6 MG/DL (ref 0.6–1.1)
GFR AFRICAN AMERICAN: >60
GFR NON-AFRICAN AMERICAN: >60
GLOBULIN: 1.7 G/DL
GLUCOSE BLD-MCNC: 87 MG/DL (ref 70–99)
HCT VFR BLD CALC: 43 % (ref 36–48)
HDLC SERPL-MCNC: 52 MG/DL (ref 40–60)
HEMOGLOBIN: 14.2 G/DL (ref 12–16)
LDL CHOLESTEROL CALCULATED: 101 MG/DL
MCH RBC QN AUTO: 29.3 PG (ref 26–34)
MCHC RBC AUTO-ENTMCNC: 33 G/DL (ref 31–36)
MCV RBC AUTO: 88.9 FL (ref 80–100)
PDW BLD-RTO: 13.7 % (ref 12.4–15.4)
PLATELET # BLD: 260 K/UL (ref 135–450)
PMV BLD AUTO: 8.6 FL (ref 5–10.5)
POTASSIUM SERPL-SCNC: 4.2 MMOL/L (ref 3.5–5.1)
RBC # BLD: 4.84 M/UL (ref 4–5.2)
SODIUM BLD-SCNC: 141 MMOL/L (ref 136–145)
TOTAL PROTEIN: 6 G/DL (ref 6.4–8.2)
TRIGL SERPL-MCNC: 128 MG/DL (ref 0–150)
VLDLC SERPL CALC-MCNC: 26 MG/DL
WBC # BLD: 6.4 K/UL (ref 4–11)

## 2021-01-13 ENCOUNTER — HOSPITAL ENCOUNTER (OUTPATIENT)
Dept: WOMENS IMAGING | Age: 56
Discharge: HOME | End: 2021-01-13
Payer: OTHER GOVERNMENT

## 2021-01-13 DIAGNOSIS — Z12.31 VISIT FOR SCREENING MAMMOGRAM: ICD-10-CM

## 2021-01-13 PROCEDURE — 77067 SCR MAMMO BI INCL CAD: CPT

## 2021-03-25 NOTE — PROGRESS NOTES
[facility]  Carlena Buerger, MD  [phone]      [address]  1965    54 y.o. female     Date of Visit: 3/30/2021    Last Visit: 1yr    Chief Complaint: Skin check    History of Present Illness:  1. Here for mole check. No new moles. No moles changing in size, shape, color. None associated w/ pain, bleeding, pruritus.    -Uses SPF 30+ sunscreen regularly      2. F/u chronic hand dermatitis. Current regimen - lidex oint   -Since last visit, reports flaring intermittently over the winter. But moisturizes and uses gloves regularly     3. Reports a rough lesion on R cheek     Derm History:   -Seb derm, scalp - nizoral shampoo tiw, lidex soln prn    Review of Systems:  Constitutional: Reports general sense of well-being. Skin: No interval severe sunburns. Allergies: Reviewed and updated. Past Medical History, Surgical History, Medications and Allergies reviewed.      Past Medical History:   Diagnosis Date    Hypertension     Low back pain     Nausea & vomiting     Ovarian tumor (benign)      Past Surgical History:   Procedure Laterality Date    BREAST LUMPECTOMY Left 1988    COLONOSCOPY  1/11/2012    hyperpl    CYSTOSCOPY  2002/2003    FOOT SURGERY      HYSTERECTOMY      HYSTERECTOMY, TOTAL ABDOMINAL      OTHER SURGICAL HISTORY  10/31/11    EXPLORATORY LAPAROTOMY,lysis of adhesions, EXCISION OF PELVIC       Allergies   Allergen Reactions    Cephalexin Nausea And Vomiting    Cephalosporins Nausea And Vomiting    Clindamycin/Lincomycin Nausea And Vomiting    Neosporin [Bacitracin-Neomycin-Polymyxin] Rash    Percocet [Oxycodone-Acetaminophen] Nausea And Vomiting     Outpatient Medications Marked as Taking for the 3/30/21 encounter (Office Visit) with Carlena Buerger, MD   Medication Sig Dispense Refill    valsartan-hydrochlorothiazide (DIOVAN-HCT) 80-12.5 MG per tablet TAKE 1 TABLET DAILY 90 tablet 3    ketoconazole (NIZORAL) 2 % shampoo WASH EXTERNALLY TO THE SCALP 3 TIMES EVERY WEEK 120 mL 3    fluocinonide (LIDEX) 0.05 % ointment Apply sparingly to affected area(s) bid prn for flares, up to 2 weeks at a time. Do not apply on cleared skin. 30 g 1    fluocinonide (LIDEX) 0.05 % external solution APPLY SPARINGLY TO AFFECTED AREAS OF SCALP EVERY DAY AS NEEDED FOR FLARES 60 mL 2    Multiple Vitamins-Minerals (MULTIVITAMIN ADULT PO) Take by mouth      Cholecalciferol (VITAMIN D3) 5000 UNITS CAPS Take 1 capsule by mouth daily         Physical Examination     The following were examined and determined to be normal: Psych/Neuro, Scalp/hair, Conjunctivae/eyelids, Gums/teeth/lips, Neck, Nails/digits and Genitalia/groin/buttocks. The following were examined and determined to be abnormal: Head/face, Breast/axilla/chest, Abdomen, Back, RUE, LUE, RLE and LLE. -General: Well-appearing, NAD  1. Scattered on the trunk and extremities are multiple well-defined round and oval symmetric smoothly-bordered uniformly brown macules and papules. 2. Central R palm - clear  3. R temple 2, R upper cheek 1 - ill-defined irregularly-shaped roughly-scaling thin pink macule(s)/papule(s)     Assessment and Plan     1. Benign acquired melanocytic nevi  -Recommend monthly self skin exams   -Educated regarding the ABCDEs of melanoma detection   -Call for any new/changing moles or concerning lesions  -Reviewed sun protective behavior -- sun avoidance during the peak hours of the day, sun-protective clothing (including hat and sunglasses), sunscreen use (water resistant, broad spectrum, SPF at least 30, need for reapplication every 2 to 3 hours), avoidance of tanning beds   -Return for full skin exam in 1 year (sooner if indicated)     2. Chronic hand dermatitis - improved, clear today   -Pt was educated re: chronicity, use of topical steroids for flares, importance of dry skin care   -Cont lidex oint bid prn.  Edu re: sparing use, atrophy, striae, hypopigmentation, telangiectasias.  -Avoid excessive wetwork  -Use gloves to protect hands when washing dishes, doing housework  -Decrease frequency of hand-washing  -Use a thick cream or ointment-based moisturizer after each hand-washing    3. Actinic keratosis(es)  -Edu re: relationship with chronic cumulative sun exposure, low premalignant potential.   -3 lesion(s) treated w/ liquid nitrogen x 2 cycles - R temple 2, R upper cheek 1. Edu re: risk of blister formation, discomfort, scar, hypopigmentation. Discussed wound care w/ vaseline or Aquaphor.

## 2021-03-25 NOTE — PATIENT INSTRUCTIONS
the next several days. For this discomfort, you may take acetaminophen (Tylenol©).  A blister may develop at the treated area, sometimes as soon as by the end of the day. After several days, the blister will subside and a scab will form.  If the area is bumped or traumatized during the first few days following freezing, you may develop bleeding into the blister, forming a blood blister. This is nothing to be alarmed about.  If the blister is tense, uncomfortable, or much larger than the site that was frozen, you may pop the blister along its edge with a sterile needle (boiled, heated under a flame, or cleaned with alcohol) to allow the fluid to drain out. If the blister does not bother you, no treatment is needed.  Do NOT peel off the top of the blister roof. It will act as a dressing on top of your wound. WOUND CARE:    You may shower or bathe as usual, but avoid scrubbing the areas that have been frozen.  Cleanse the site twice a day with mild soapy water, and then apply a thin film of white petrolatum (Vaseline©).  You do not need to cover the area, but can if you prefer.  Do NOT allow the site to become dry or crusted, or attempt to dry it out with rubbing alcohol or hydrogen peroxide.  Continue this regimen until the area is pink and healed. Depending on the size and location of your cryosurgery site, healing may take 2 to 4 weeks.  The area may continue to be pink for several weeks, and over the next few months may become darker or lighter than the surrounding skin. This may be a permanent change.

## 2021-03-30 ENCOUNTER — OFFICE VISIT (OUTPATIENT)
Dept: DERMATOLOGY | Age: 56
End: 2021-03-30
Payer: OTHER GOVERNMENT

## 2021-03-30 VITALS — TEMPERATURE: 97 F

## 2021-03-30 DIAGNOSIS — L30.9 HAND DERMATITIS: ICD-10-CM

## 2021-03-30 DIAGNOSIS — D22.9 MULTIPLE BENIGN NEVI: Primary | ICD-10-CM

## 2021-03-30 DIAGNOSIS — L57.0 ACTINIC KERATOSES: ICD-10-CM

## 2021-03-30 DIAGNOSIS — Z12.83 SCREENING EXAM FOR SKIN CANCER: ICD-10-CM

## 2021-03-30 PROCEDURE — 99214 OFFICE O/P EST MOD 30 MIN: CPT | Performed by: DERMATOLOGY

## 2021-03-30 PROCEDURE — 17003 DESTRUCT PREMALG LES 2-14: CPT | Performed by: DERMATOLOGY

## 2021-03-30 PROCEDURE — 17000 DESTRUCT PREMALG LESION: CPT | Performed by: DERMATOLOGY

## 2021-04-12 RX ORDER — FLUOCINONIDE TOPICAL SOLUTION USP, 0.05% 0.5 MG/ML
SOLUTION TOPICAL
Qty: 60 ML | Refills: 2 | Status: SHIPPED | OUTPATIENT
Start: 2021-04-12 | End: 2021-09-03 | Stop reason: ALTCHOICE

## 2021-07-30 RX ORDER — KETOCONAZOLE 20 MG/ML
SHAMPOO TOPICAL
Qty: 120 ML | Refills: 3 | Status: SHIPPED | OUTPATIENT
Start: 2021-07-30

## 2021-07-30 NOTE — TELEPHONE ENCOUNTER
Last visit 3-   Last refill for ketoconazole 2% shampoo 5-9-2020. Called patient and left message and  informed her that  is no longer with our practice. Advised her that I will put in the refill request to see if another physician can refill for a one time only, but that she will need to establish with a new dermatologist or get refill from her PCP.

## 2021-09-02 NOTE — PROGRESS NOTES
9/3/2021    Lorin Robins (:  1965) is a 54 y.o. female, here for a preventive medicine evaluation. Chief Complaint   Patient presents with    Annual Exam        Diagnosis Orders   1. Annual physical exam  CBC    Comprehensive Metabolic Panel    Lipid Panel   2. Essential hypertension  valsartan-hydroCHLOROthiazide (DIOVAN-HCT) 80-12.5 MG per tablet     Marimar was seen today for annual exam.    Diagnoses and all orders for this visit:    Annual physical exam  -     CBC  -     Comprehensive Metabolic Panel  -     Lipid Panel    Essential hypertension  -     valsartan-hydroCHLOROthiazide (DIOVAN-HCT) 80-12.5 MG per tablet; TAKE 1 TABLET DAILY        Hpi  Right arm pain at biceps for 1 week, did help with cutting a tree and moving logs. Plan: if continues xray and ortho. Patient Active Problem List   Diagnosis    Essential hypertension    Patient overweight    History of colonic diverticulitis    OA (osteoarthritis) of hip    DDD (degenerative disc disease), lumbar    Tendinitis in region of greater trochanter of left femur    Vitamin D deficiency    Eczema    Seborrheic dermatitis of scalp    Polyp of colon    Localized swelling, mass or lump of neck     Vitals:    21 1013 21 1016   BP: (!) 112/98 (!) 124/92   Site: Left Upper Arm Right Upper Arm   Position: Sitting Sitting   Cuff Size: Medium Adult Medium Adult   Pulse: 78    SpO2: 98%    Weight: 216 lb (98 kg)    Height: 5' 5\" (1.651 m)      Body mass index is 35.94 kg/m². Wt Readings from Last 3 Encounters:   21 216 lb (98 kg)   20 215 lb 2.7 oz (97.6 kg)   19 215 lb 3.2 oz (97.6 kg)     BP Readings from Last 3 Encounters:   21 (!) 124/92   20 122/80   19 122/64        Review of Systems   Constitutional: Negative for unexpected weight change. HENT: Negative. Eyes: Negative for redness. Respiratory: Negative for shortness of breath.     Cardiovascular: Negative for chest pain and leg swelling. Gastrointestinal: Negative for constipation, diarrhea, nausea and vomiting. Skin: Negative for pallor. Allergic/Immunologic: Negative for immunocompromised state. Psychiatric/Behavioral: Negative for confusion. Prior to Visit Medications    Medication Sig Taking? Authorizing Provider   valsartan-hydroCHLOROthiazide (DIOVAN-HCT) 80-12.5 MG per tablet TAKE 1 TABLET DAILY Yes Ming Torres,    ketoconazole (NIZORAL) 2 % shampoo WASH EXTERNALLY TO THE SCALP 3 TIMES EVERY WEEK Yes Sam Corrigan MD   fluocinonide (LIDEX) 0.05 % ointment Apply sparingly to affected area(s) bid prn for flares, up to 2 weeks at a time. Do not apply on cleared skin.  Yes Rosa Pierce MD   Multiple Vitamins-Minerals (MULTIVITAMIN ADULT PO) Take by mouth Yes Historical Provider, MD   Cholecalciferol (VITAMIN D3) 5000 UNITS CAPS Take 1 capsule by mouth daily Yes Historical Provider, MD        Allergies   Allergen Reactions    Cephalexin Nausea And Vomiting    Cephalosporins Nausea And Vomiting    Clindamycin/Lincomycin Nausea And Vomiting    Neosporin [Bacitracin-Neomycin-Polymyxin] Rash    Percocet [Oxycodone-Acetaminophen] Nausea And Vomiting       Past Medical History:   Diagnosis Date    Hypertension     Low back pain     Nausea & vomiting     Ovarian tumor (benign)        Past Surgical History:   Procedure Laterality Date    BREAST LUMPECTOMY Left 1988    COLONOSCOPY  1/11/2012    hyperpl    CYSTOSCOPY  2002/2003    FOOT SURGERY      HYSTERECTOMY      HYSTERECTOMY, TOTAL ABDOMINAL      OTHER SURGICAL HISTORY  10/31/11    EXPLORATORY LAPAROTOMY,lysis of adhesions, EXCISION OF PELVIC       Social History     Socioeconomic History    Marital status:      Spouse name: Not on file    Number of children: Not on file    Years of education: Not on file    Highest education level: Not on file   Occupational History    Not on file   Tobacco Use    Smoking status: Never Smoker    Smokeless tobacco: Never Used   Vaping Use    Vaping Use: Never used   Substance and Sexual Activity    Alcohol use: No     Alcohol/week: 0.0 standard drinks    Drug use: No    Sexual activity: Yes     Partners: Male     Birth control/protection: Surgical   Other Topics Concern    Not on file   Social History Narrative    Not on file     Social Determinants of Health     Financial Resource Strain: Low Risk     Difficulty of Paying Living Expenses: Not hard at all   Food Insecurity: No Food Insecurity    Worried About [address] in the Last Year: Never true    [facility] in the Last Year: Never true   Transportation Needs:     Lack of Transportation (Medical):  Lack of Transportation (Non-Medical):    Physical Activity:     Days of Exercise per Week:     Minutes of Exercise per Session:    Stress:     Feeling of Stress :    Social Connections:     Frequency of Communication with Friends and Family:     Frequency of Social Gatherings with Friends and Family:     Attends Roman Catholic Services:     Active Member of Clubs or Organizations:     Attends Club or Organization Meetings:     Marital Status:    Intimate Partner Violence:     Fear of Current or Ex-Partner:     Emotionally Abused:     Physically Abused:     Sexually Abused:        Family History   Problem Relation Age of Onset    Cancer Mother         Breast    High Blood Pressure Mother     Heart Disease Mother     High Blood Pressure Father     Diabetes Father     High Blood Pressure Sister     High Blood Pressure Brother            Vitals:    09/03/21 1013 09/03/21 1016   BP: (!) 112/98 (!) 124/92   Site: Left Upper Arm Right Upper Arm   Position: Sitting Sitting   Cuff Size: Medium Adult Medium Adult   Pulse: 78    SpO2: 98%    Weight: 216 lb (98 kg)    Height: 5' 5\" (1.651 m)      Estimated body mass index is 35.94 kg/m² as calculated from the following:    Height as of this encounter: 5' 5\" (1.651 m).     Weight as of this encounter: 216 lb (98 kg). BP Readings from Last 5 Encounters:   09/03/21 (!) 124/92   01/13/20 122/80   09/09/19 122/64   08/12/19 126/86   08/10/18 128/88        Oriented to person, place, and time. HEENT:Normocephalic, atraumatic. No scleral icterus. Pupils normal  Heart: Regular Rate and Rhythm. No murmurs. Respirations: lungs clear to auscultation bilaterally. Abdomin: Bowel sounds present. Extremities: No peripheral edema. No erythema. The 10-year ASCVD risk score (Sabrina Stafford, et al., 2013) is: 2.4%    Values used to calculate the score:      Age: 54 years      Sex: Female      Is Non- : No      Diabetic: No      Tobacco smoker: No      Systolic Blood Pressure: 347 mmHg      Is BP treated: Yes      HDL Cholesterol: 52 mg/dL      Total Cholesterol: 179 mg/dL    Immunization History   Administered Date(s) Administered    COVID-19, Pfizer, PF, 30mcg/0.3mL 03/09/2021, 04/06/2021    Td, unspecified formulation 01/01/2003    Tdap (Boostrix, Adacel) 06/29/2009, 08/12/2019    Zoster Recombinant (Shingrix) 10/24/2019, 01/10/2020       Health Maintenance   Topic Date Due    Flu vaccine (1) Never done    Potassium monitoring  09/23/2021    Creatinine monitoring  09/23/2021    Colon cancer screen colonoscopy  01/11/2022    Breast cancer screen  01/13/2023    Lipid screen  09/23/2025    DTaP/Tdap/Td vaccine (3 - Td or Tdap) 08/12/2029    Shingles Vaccine  Completed    COVID-19 Vaccine  Completed    Hepatitis C screen  Completed    Hepatitis A vaccine  Aged Out    Hepatitis B vaccine  Aged Out    Hib vaccine  Aged Out    Meningococcal (ACWY) vaccine  Aged Out    Pneumococcal 0-64 years Vaccine  Aged Out    HIV screen  Discontinued               electronic signature was used to authenticate this note. This note is dictated, errors are possible.   --Casey Perez, DO on 9/3/2021 at 10:37 AM

## 2021-09-03 ENCOUNTER — OFFICE VISIT (OUTPATIENT)
Dept: FAMILY MEDICINE CLINIC | Age: 56
End: 2021-09-03
Payer: OTHER GOVERNMENT

## 2021-09-03 VITALS
WEIGHT: 216 LBS | SYSTOLIC BLOOD PRESSURE: 124 MMHG | DIASTOLIC BLOOD PRESSURE: 92 MMHG | HEIGHT: 65 IN | BODY MASS INDEX: 35.99 KG/M2 | HEART RATE: 78 BPM | OXYGEN SATURATION: 98 %

## 2021-09-03 DIAGNOSIS — Z00.00 ANNUAL PHYSICAL EXAM: Primary | ICD-10-CM

## 2021-09-03 DIAGNOSIS — I10 ESSENTIAL HYPERTENSION: ICD-10-CM

## 2021-09-03 PROCEDURE — 99396 PREV VISIT EST AGE 40-64: CPT | Performed by: FAMILY MEDICINE

## 2021-09-03 RX ORDER — VALSARTAN AND HYDROCHLOROTHIAZIDE 80; 12.5 MG/1; MG/1
TABLET, FILM COATED ORAL
Qty: 90 TABLET | Refills: 3 | Status: SHIPPED | OUTPATIENT
Start: 2021-09-03 | End: 2022-10-11

## 2021-09-03 SDOH — ECONOMIC STABILITY: FOOD INSECURITY: WITHIN THE PAST 12 MONTHS, YOU WORRIED THAT YOUR FOOD WOULD RUN OUT BEFORE YOU GOT MONEY TO BUY MORE.: NEVER TRUE

## 2021-09-03 SDOH — ECONOMIC STABILITY: FOOD INSECURITY: WITHIN THE PAST 12 MONTHS, THE FOOD YOU BOUGHT JUST DIDN'T LAST AND YOU DIDN'T HAVE MONEY TO GET MORE.: NEVER TRUE

## 2021-09-03 ASSESSMENT — PATIENT HEALTH QUESTIONNAIRE - PHQ9
SUM OF ALL RESPONSES TO PHQ QUESTIONS 1-9: 0
SUM OF ALL RESPONSES TO PHQ9 QUESTIONS 1 & 2: 0
2. FEELING DOWN, DEPRESSED OR HOPELESS: 0
SUM OF ALL RESPONSES TO PHQ QUESTIONS 1-9: 0
SUM OF ALL RESPONSES TO PHQ QUESTIONS 1-9: 0
1. LITTLE INTEREST OR PLEASURE IN DOING THINGS: 0

## 2021-09-03 ASSESSMENT — SOCIAL DETERMINANTS OF HEALTH (SDOH): HOW HARD IS IT FOR YOU TO PAY FOR THE VERY BASICS LIKE FOOD, HOUSING, MEDICAL CARE, AND HEATING?: NOT HARD AT ALL

## 2021-09-03 NOTE — PATIENT INSTRUCTIONS
Your fasting blood sugar should be below 100. If it is between 100-125 that is considered high and known as prediabetes, or  impaired glucose tolerance. If your blood sugar is too high, go to diabetes. org for a diabetes prevention diet. The A1c shows your average blood sugar over the previous 3 months. It is sometimes ordered if your blood sugar is elevated  You do not have to be fasting to get it drawn. If it is 5.6 or below it means your blood sugar is in the normal range  If between 5.7 and 6.5 it is impaired glucose tolerance. If it is above 6.5 it is consistent with the diagnosis of  diabetes. Decrease high carb foods if your blood sugar is high. High carbohydrate foods are:  Breads, muffins, rolls, and bagels  Pasta, rice, corn, and grains  Potatoes, sweet potatoes  Legumes: peas beans lentils. Milk ( almond milk ok)  Most fruit except berries  Cake cookies pies ice cream candy etc.  Juices, soda, sweetened ice tea  Beer. Cholesterol, the ideal numbers explained: Total cholesterol should be below 200  The triglycerides should be below 150  The HDL, the good cholesterol should be above 40  The LDL, the bad cholesterol should be below 100. Although it can be as high as 130 if no risk factors for heart disease or no diabetes. Improve your cholesterol profile:     Cardiovascular exercise helps. Start with 15 minutes three times a week and the work up to at least 30 minutes 5 days a week. Exercise at a level that you can carry on a conversation during. Loose extra pounds. Pay attention to your serving sides and avoid eating second helpings at meals. Significantly decrease the amount of processed food, junk food, and fast food that you eat. Decrease animal sources of saturated fats, and completely avoid and eliminate  hydrogenated oils and trans fats. Check the Food Label on the food you eat and avoid foods that have hydrogenated vegetable oils in them.  That includes bakery products. Drink skim milk which contains no fat. Increase the amount of fresh food that you cook yourself. Eat at least five servings of fruits and vegetables a day. Increase the portion of your plate that contains the fruit and vegetables to at least 50%,-70%,  and decrease the amount of starches like potatoes, rice, pasta to no more than 25% of your plate. Increase your fiber intake. Fiber is found in fruits and vegetables as well as whole grains, oatmeal,  and beans. A diet with at least 5 servings of fruits and vegetables should give you about 10-20grams of fiber daily. Switch to monounsaturated fats like olive oil. Fat from olives, avocados, coconut, or other nuts is okay. Add baked or grilled fish to you diet at least 1-2 times a week. Learn more about cholesterol on the Internet. Check out these resources:    American Heart Association   Heart. [address]:   [facility]    Triglycerides can be lowered without medication by exercising, and loosing weight and eating a diet lower in carbohydrates especially from flour sources,  and avoiding simple sugars. The good cholesterol is HDL. In order to increase the good cholesterol, increasing cardiovascular exercise helps. Avoid hydrogenated oils (trans fats) in processed, bakery,  and junk food. Use monounsaturated fats such as olive oil can help raise the good cholesterol. For your weight, exercise 30 minutes 5 times weekly and improve the types of food you eat:    Protein   Best options: The American Diabetes Association (ADA) recommends lean proteins low in saturated fat, like fish or turkey. Aim for two servings of seafood each week; some fish, like salmon, have the added benefit of containing heart healthy omega-3 fats. For a vegetarian protein source, experiment with the wide variety of beans. Nuts, which are protein and healthy fats powerhouses, are also a choice.    Worst options: Processed deli meats and hot dogs have high amounts of fat along with lots of sodium, which can increase the risk of high blood pressure. Heart attack and stroke are two common complications of diabetes, so keeping blood pressure in check is important. Grains   Best options: When choosing grains, make sure theyre whole. Decrease the amount of foods which contain flour. Whole grains such as wild rice, quinoa, and whole grain breads and cereals contain fiber, which is beneficial for digestive health, but often the grains flour products raise your blood sugar and when your blood sugar returns to normal, it can trigger the desire to eat again.   Worst options: Refined white flour doesnt contain the same health benefits as whole grains. Processed foods made with white flour include breakfast cereals, white bread, and pastries, cookies, muffins, pretzels, crackers, so avoid these options. Also try to steer clear of white rice and pasta. Dairy   Best options: With only 6 to 8 grams of carbohydrates in a serving, plain nonfat [place] yogurt is a healthy and versatile dairy option. You can add berries and enjoy it for dessert or breakfast; you can use it in recipes as a replacement for sour cream, which is high in saturated fat. Skim milk.  Worst options: Avoid all full-fat dairy products and especially packaged chocolate milk, as it also has added sugar. Avoid yogurts with added sugar. Vegetables   Best options: Non-starchy vegetables such as leafy greens, broccoli, cauliflower, asparagus, and carrots are low in carbohydrates and high in fiber and other nutrients, Blake Martinez says. You can eat non-starchy vegetables in abundance -- half of your plate should be filled with these veggies. If youre craving mashed potatoes, give mashed cauliflower a try.  Worst options: Stick to small portions of starchy vegetables such as corn, potatoes, and peas. These items are nutritious, but should be eaten in moderation.  The ADA groups them with grains because of their high carb content. Fruit   Best options: Fresh fruit can conquer your craving for sweets while providing antioxidants and fiber. Berries are a great option because recommended portion sizes are typically generous, which may leave you feeling more satisfied   Worst options: Avoid added sugar by eliminating fruits canned in syrup, and be aware that dried fruits have a very high sugar concentration. Also, fruit juices should be consumed rarely as theyre high in sugar and dont contain the same nutrients as whole fruit. Fats   Best options: Some types of fat actually help protect your heart. Choose the monounsaturated fats found in avocados, almonds, olives, and pecans or the polyunsaturated fats found in walnuts and sunflower oil, which can help to lower bad cholesterol.  Worst options: Saturated fats increase bad cholesterol, so limit butter, cheese, gravy, and fried foods. Keep calories from animal sources of saturated fat to less than 10 percent of your total daily intake. Trans fats are even worse than saturated fats, so avoid them completely. Look for the term hydrogenated on labels of processed foods such as packaged snacks, baked goods, and crackers.  For more help losing weight:         Keep track of what you eat everyday :      Lose it! frieda for the smart phone, or loseit. com or OfferSavvy.CartMomo are free online tools you can use to track your daily intake.     The online tools will help you establish your goals for weight loss and how much you should eat a day to accomplish the goal.       A BMI (body mass index) of 25 to 29 is considered overweight, a BMI of 30 and above is considered obese. BMI is found with your vitals on your After Visit Summary. In addition to the above diet info,    Iincrease the amount of fruit and vegetables you eat each day. Ideally you are eating at least 5 servings of fruit and or vegetables a day.        Drink at least 50 ounces of water a day.      Avoid or decrease processed food, fast food, and junk food.     Begin to exercise 15 minutes three times a week doing cardiovascular exercise.     Don't quit. It can take 12 weeks to break old habits before you feel like you are in a new routine. Then you have to live your new lifestyle. Veda Ramirez Once you are adjusted to your new habits you will not have to keep recording your daily intake, you should have a good idea of what is a normal amount for you to eat each day.     If you are obese, have elevated blood sugar, heart disease or a medical condition that is worsened by excess weight, I can refer you to a [employer] for education, usually covered by insurance.   

## 2021-09-09 LAB
A/G RATIO: 2.3 (ref 1.1–2.2)
ALBUMIN SERPL-MCNC: 4.4 G/DL (ref 3.4–5)
ALP BLD-CCNC: 50 U/L (ref 40–129)
ALT SERPL-CCNC: 19 U/L (ref 10–40)
ANION GAP SERPL CALCULATED.3IONS-SCNC: 11 MMOL/L (ref 3–16)
AST SERPL-CCNC: 15 U/L (ref 15–37)
BILIRUB SERPL-MCNC: 0.5 MG/DL (ref 0–1)
BUN BLDV-MCNC: 12 MG/DL (ref 7–20)
CALCIUM SERPL-MCNC: 9.8 MG/DL (ref 8.3–10.6)
CHLORIDE BLD-SCNC: 103 MMOL/L (ref 99–110)
CHOLESTEROL, TOTAL: 175 MG/DL (ref 0–199)
CO2: 27 MMOL/L (ref 21–32)
CREAT SERPL-MCNC: 0.6 MG/DL (ref 0.6–1.1)
GFR AFRICAN AMERICAN: >60
GFR NON-AFRICAN AMERICAN: >60
GLOBULIN: 1.9 G/DL
GLUCOSE BLD-MCNC: 97 MG/DL (ref 70–99)
HCT VFR BLD CALC: 42.2 % (ref 36–48)
HDLC SERPL-MCNC: 54 MG/DL (ref 40–60)
HEMOGLOBIN: 14.3 G/DL (ref 12–16)
LDL CHOLESTEROL CALCULATED: 101 MG/DL
MCH RBC QN AUTO: 30 PG (ref 26–34)
MCHC RBC AUTO-ENTMCNC: 33.9 G/DL (ref 31–36)
MCV RBC AUTO: 88.6 FL (ref 80–100)
PDW BLD-RTO: 13.5 % (ref 12.4–15.4)
PLATELET # BLD: 272 K/UL (ref 135–450)
PMV BLD AUTO: 8.1 FL (ref 5–10.5)
POTASSIUM SERPL-SCNC: 4.4 MMOL/L (ref 3.5–5.1)
RBC # BLD: 4.76 M/UL (ref 4–5.2)
SODIUM BLD-SCNC: 141 MMOL/L (ref 136–145)
TOTAL PROTEIN: 6.3 G/DL (ref 6.4–8.2)
TRIGL SERPL-MCNC: 102 MG/DL (ref 0–150)
VLDLC SERPL CALC-MCNC: 20 MG/DL
WBC # BLD: 5.4 K/UL (ref 4–11)

## 2022-02-28 ENCOUNTER — HOSPITAL ENCOUNTER (OUTPATIENT)
Dept: WOMENS IMAGING | Age: 57
Discharge: HOME | End: 2022-02-28
Payer: OTHER GOVERNMENT

## 2022-02-28 VITALS — HEIGHT: 65 IN | BODY MASS INDEX: 34.16 KG/M2 | WEIGHT: 205 LBS

## 2022-02-28 DIAGNOSIS — Z12.31 VISIT FOR SCREENING MAMMOGRAM: ICD-10-CM

## 2022-02-28 PROCEDURE — 77067 SCR MAMMO BI INCL CAD: CPT

## 2022-08-08 RX ORDER — KETOCONAZOLE 20 MG/ML
SHAMPOO TOPICAL
Qty: 120 ML | Refills: 3 | OUTPATIENT
Start: 2022-08-08

## 2022-08-30 NOTE — PROGRESS NOTES
08/31/22      [facility] Georgie Shimon  Chief Complaint   Patient presents with    Blood Pressure Check   Recheck on htn and last physical was sept 2021, not due yet       Diagnosis Orders   1. Uncontrolled hypertension        Serenity Montelongo was seen today for blood pressure check. Diagnoses and all orders for this visit:    Uncontrolled hypertension, added lisinopril today to the diovan hct  -     lisinopril (PRINIVIL;ZESTRIL) 5 MG tablet; Take 1 tablet by mouth daily    Recheck 4-6 weeks for physical and bp recheck    HPI  Vitals:    08/31/22 0930 08/31/22 0932   BP: (!) 153/95 (!) 141/95   Site: Left Lower Arm Right Lower Arm   Position: Sitting Sitting   Cuff Size: Large Adult Large Adult   Pulse: 76    SpO2: 96%    Weight: 205 lb (93 kg)    Height: 5' 5\" (1.651 m)      Body mass index is 34.11 kg/m².      Wt Readings from Last 3 Encounters:   08/31/22 205 lb (93 kg)   02/28/22 205 lb (93 kg)   09/03/21 216 lb (98 kg)     BP Readings from Last 3 Encounters:   08/31/22 (!) 141/95   09/03/21 (!) 124/92   01/13/20 122/80          Past Medical History:   Diagnosis Date    Hypertension     Low back pain     Nausea & vomiting     Ovarian tumor (benign)      Social History     Socioeconomic History    Marital status:      Spouse name: Not on file    Number of children: Not on file    Years of education: Not on file    Highest education level: Not on file   Occupational History    Not on file   Tobacco Use    Smoking status: Never    Smokeless tobacco: Never   Vaping Use    Vaping Use: Never used   Substance and Sexual Activity    Alcohol use: No     Alcohol/week: 0.0 standard drinks    Drug use: No    Sexual activity: Yes     Partners: Male     Birth control/protection: Surgical   Other Topics Concern    Not on file   Social History Narrative    Not on file     Social Determinants of Health     Financial Resource Strain: Low Risk     Difficulty of Paying Living Expenses: Not hard at all   Food Insecurity: No Food Insecurity Worried About Running Out of Food in the Last Year: Never true    Ran Out of Food in the Last Year: Never true   Transportation Needs: Not on file   Physical Activity: Not on file   Stress: Not on file   Social Connections: Not on file   Intimate Partner Violence: Not on file   Housing Stability: Not on file           Current Outpatient Medications   Medication Sig Dispense Refill    valsartan-hydroCHLOROthiazide (DIOVAN-HCT) 80-12.5 MG per tablet TAKE 1 TABLET DAILY 90 tablet 3    ketoconazole (NIZORAL) 2 % shampoo WASH EXTERNALLY TO THE SCALP 3 TIMES EVERY WEEK 120 mL 3    Multiple Vitamins-Minerals (MULTIVITAMIN ADULT PO) Take by mouth      Cholecalciferol (VITAMIN D3) 5000 UNITS CAPS Take 1 capsule by mouth daily       No current facility-administered medications for this visit. Vitals:    08/31/22 0932   BP: (!) 141/95   Pulse:    SpO2:      BP Readings from Last 5 Encounters:   08/31/22 (!) 141/95   09/03/21 (!) 124/92   01/13/20 122/80   09/09/19 122/64   08/12/19 126/86       ROS:  No fever or chills  No unexpected wt loss  No headaches  No chest pain  No shortness of breath  No nausea, vomiting or diarrhea  No loss of coordination. Physical Exam  Well developed well nourished patient   in no acute distress. Alert and oriented to person, place, and time. HEENT:Normocephalic, atraumatic. No scleral icterus. Pupils normal. No thyromegaly  Heart: Regular Rate and Rhythm. Respirations: lungs clear to auscultation bilaterally. Abdomin: Bowel sounds present. no masses  Extremities: No peripheral edema. No erythema.         Time spent today included for this patient visit includes time spent preparing to see the patient  Including review of tests, labs and imaging,   revewing previous history and recent encounters,   obtaining and/or reviewing separately obtained history in care everywhere,  performing a medically appropriate examination and/or evaluation;   counseling and educating the patient and /family/caregiver when present,  ordering medications, tests, or procedures;   referring to other health care specialists;   documenting clinical information in the electronic health record;   independently interpreting results (not separately reported)   and communicating results to the patient. An electronic signature was used to authenticate this note. This was dictated. Errors mightbe possible due to dictation.   --Kylie Veloz,

## 2022-08-31 ENCOUNTER — OFFICE VISIT (OUTPATIENT)
Dept: FAMILY MEDICINE CLINIC | Age: 57
End: 2022-08-31
Payer: OTHER GOVERNMENT

## 2022-08-31 VITALS
SYSTOLIC BLOOD PRESSURE: 141 MMHG | OXYGEN SATURATION: 96 % | DIASTOLIC BLOOD PRESSURE: 95 MMHG | HEIGHT: 65 IN | HEART RATE: 76 BPM | WEIGHT: 205 LBS | BODY MASS INDEX: 34.16 KG/M2

## 2022-08-31 DIAGNOSIS — I10 UNCONTROLLED HYPERTENSION: Primary | ICD-10-CM

## 2022-08-31 DIAGNOSIS — I10 UNCONTROLLED HYPERTENSION: ICD-10-CM

## 2022-08-31 PROCEDURE — 99214 OFFICE O/P EST MOD 30 MIN: CPT | Performed by: FAMILY MEDICINE

## 2022-08-31 RX ORDER — LISINOPRIL 5 MG/1
5 TABLET ORAL DAILY
Qty: 90 TABLET | Refills: 1 | Status: SHIPPED | OUTPATIENT
Start: 2022-08-31

## 2022-08-31 RX ORDER — LISINOPRIL 5 MG/1
5 TABLET ORAL DAILY
Qty: 30 TABLET | Refills: 11 | Status: SHIPPED | OUTPATIENT
Start: 2022-08-31 | End: 2022-08-31

## 2022-08-31 ASSESSMENT — PATIENT HEALTH QUESTIONNAIRE - PHQ9
SUM OF ALL RESPONSES TO PHQ QUESTIONS 1-9: 0
SUM OF ALL RESPONSES TO PHQ9 QUESTIONS 1 & 2: 0
2. FEELING DOWN, DEPRESSED OR HOPELESS: 0
SUM OF ALL RESPONSES TO PHQ QUESTIONS 1-9: 0
1. LITTLE INTEREST OR PLEASURE IN DOING THINGS: 0
SUM OF ALL RESPONSES TO PHQ QUESTIONS 1-9: 0
SUM OF ALL RESPONSES TO PHQ QUESTIONS 1-9: 0

## 2022-08-31 NOTE — TELEPHONE ENCOUNTER
Future Appointments   Date Time Provider Davida Rodarte   10/14/2022  1:00 PM Hamzah Nolan DO [id number] 8/31/2022

## 2022-10-10 DIAGNOSIS — I10 ESSENTIAL HYPERTENSION: ICD-10-CM

## 2022-10-10 NOTE — TELEPHONE ENCOUNTER
8/31/2022    Future Appointments   Date Time Provider Davida Rodarte   10/14/2022  1:00 PM DO Miranda Barry

## 2022-10-11 RX ORDER — VALSARTAN AND HYDROCHLOROTHIAZIDE 80; 12.5 MG/1; MG/1
TABLET, FILM COATED ORAL
Qty: 90 TABLET | Refills: 3 | Status: SHIPPED | OUTPATIENT
Start: 2022-10-11

## 2022-10-12 DIAGNOSIS — Z00.00 PHYSICAL EXAM: Primary | ICD-10-CM

## 2022-10-12 DIAGNOSIS — Z00.00 PHYSICAL EXAM: ICD-10-CM

## 2022-10-12 LAB
A/G RATIO: 2.4 (ref 1.1–2.2)
ALBUMIN SERPL-MCNC: 4.5 G/DL (ref 3.4–5)
ALP BLD-CCNC: 45 U/L (ref 40–129)
ALT SERPL-CCNC: 19 U/L (ref 10–40)
ANION GAP SERPL CALCULATED.3IONS-SCNC: 13 MMOL/L (ref 3–16)
AST SERPL-CCNC: 13 U/L (ref 15–37)
BASOPHILS ABSOLUTE: 0.1 K/UL (ref 0–0.2)
BASOPHILS RELATIVE PERCENT: 1.3 %
BILIRUB SERPL-MCNC: 0.4 MG/DL (ref 0–1)
BUN BLDV-MCNC: 11 MG/DL (ref 7–20)
CALCIUM SERPL-MCNC: 9.5 MG/DL (ref 8.3–10.6)
CHLORIDE BLD-SCNC: 103 MMOL/L (ref 99–110)
CHOLESTEROL, TOTAL: 191 MG/DL (ref 0–199)
CO2: 26 MMOL/L (ref 21–32)
CREAT SERPL-MCNC: 0.6 MG/DL (ref 0.6–1.1)
EOSINOPHILS ABSOLUTE: 0.3 K/UL (ref 0–0.6)
EOSINOPHILS RELATIVE PERCENT: 5.9 %
GFR AFRICAN AMERICAN: >60
GFR NON-AFRICAN AMERICAN: >60
GLUCOSE BLD-MCNC: 90 MG/DL (ref 70–99)
HCT VFR BLD CALC: 41.8 % (ref 36–48)
HDLC SERPL-MCNC: 52 MG/DL (ref 40–60)
HEMOGLOBIN: 14.3 G/DL (ref 12–16)
LDL CHOLESTEROL CALCULATED: 112 MG/DL
LYMPHOCYTES ABSOLUTE: 1.9 K/UL (ref 1–5.1)
LYMPHOCYTES RELATIVE PERCENT: 39.3 %
MCH RBC QN AUTO: 30.5 PG (ref 26–34)
MCHC RBC AUTO-ENTMCNC: 34.3 G/DL (ref 31–36)
MCV RBC AUTO: 88.9 FL (ref 80–100)
MONOCYTES ABSOLUTE: 0.3 K/UL (ref 0–1.3)
MONOCYTES RELATIVE PERCENT: 5.9 %
NEUTROPHILS ABSOLUTE: 2.3 K/UL (ref 1.7–7.7)
NEUTROPHILS RELATIVE PERCENT: 47.6 %
PDW BLD-RTO: 13.4 % (ref 12.4–15.4)
PLATELET # BLD: 276 K/UL (ref 135–450)
PMV BLD AUTO: 8.1 FL (ref 5–10.5)
POTASSIUM SERPL-SCNC: 4.1 MMOL/L (ref 3.5–5.1)
RBC # BLD: 4.7 M/UL (ref 4–5.2)
SODIUM BLD-SCNC: 142 MMOL/L (ref 136–145)
TOTAL PROTEIN: 6.4 G/DL (ref 6.4–8.2)
TRIGL SERPL-MCNC: 134 MG/DL (ref 0–150)
VLDLC SERPL CALC-MCNC: 27 MG/DL
WBC # BLD: 4.9 K/UL (ref 4–11)

## 2022-10-12 NOTE — PROGRESS NOTES
10/14/2022    Adelia Robins (:  1965) is a 62 y.o. female, here for a preventive medicine evaluation. Chief Complaint   Patient presents with    Hypertension     Follow up        Diagnosis Orders   1. Annual physical exam          Jason Cunningham was seen today for hypertension. Diagnoses and all orders for this visit:    Annual physical exam  Already had lab done, reviewed. Other orders  -     Influenza, FLUCELVAX, (age 10 mo+), IM, PF, 0.5 mL        Patient Active Problem List   Diagnosis    Essential hypertension    Patient overweight    History of colonic diverticulitis    OA (osteoarthritis) of hip    DDD (degenerative disc disease), lumbar    Tendinitis in region of greater trochanter of left femur    Vitamin D deficiency    Eczema    Seborrheic dermatitis of scalp    Polyp of colon    Localized swelling, mass or lump of neck     Vitals:    10/14/22 1306   BP: 120/76   Pulse: 77   Resp: 16   Temp: 97.6 °F (36.4 °C)   SpO2: 98%   Weight: 207 lb (93.9 kg)     Body mass index is 34.45 kg/m². Wt Readings from Last 3 Encounters:   10/14/22 207 lb (93.9 kg)   22 205 lb (93 kg)   22 205 lb (93 kg)     BP Readings from Last 3 Encounters:   10/14/22 120/76   22 (!) 141/95   21 (!) 124/92          Review of Systems   Constitutional: Negative for unexpected weight change. HENT: Negative. Eyes: Negative for redness. Respiratory: Negative for shortness of breath. Cardiovascular: Negative for chest pain and leg swelling. Gastrointestinal: Negative for constipation, diarrhea, nausea and vomiting. Skin: Negative for pallor. Allergic/Immunologic: Negative for immunocompromised state. Psychiatric/Behavioral: Negative for confusion. Prior to Visit Medications    Medication Sig Taking?  Authorizing Provider   valsartan-hydroCHLOROthiazide (DIOVAN-HCT) 80-12.5 MG per tablet TAKE 1 TABLET BY MOUTH DAILY Yes Naveed Alexander,    lisinopril (PRINIVIL;ZESTRIL) 5 MG tablet TAKE 1 TABLET BY MOUTH DAILY Yes China Jasmine DO   ketoconazole (NIZORAL) 2 % shampoo [address] EXTERNALLY TO THE SCALP 3 TIMES EVERY WEEK Yes Delvis Calderon MD   Multiple Vitamins-Minerals (MULTIVITAMIN ADULT PO) Take by mouth Yes Historical Provider, MD   Cholecalciferol (VITAMIN D3) 5000 UNITS CAPS Take 1 capsule by mouth daily Yes Historical Provider, MD        Allergies   Allergen Reactions    Cephalexin Nausea And Vomiting    Cephalosporins Nausea And Vomiting    Clindamycin/Lincomycin Nausea And Vomiting    Neosporin [Bacitracin-Neomycin-Polymyxin] Rash    Percocet [Oxycodone-Acetaminophen] Nausea And Vomiting       Past Medical History:   Diagnosis Date    Hypertension     Low back pain     Nausea & vomiting     Ovarian tumor (benign)        Past Surgical History:   Procedure Laterality Date    BREAST LUMPECTOMY Left 1988    COLONOSCOPY  1/11/2012    hyperpl    CYSTOSCOPY  2002/2003    FOOT SURGERY      HYSTERECTOMY (CERVIX STATUS UNKNOWN)      HYSTERECTOMY, TOTAL ABDOMINAL (CERVIX REMOVED)      OTHER SURGICAL HISTORY  10/31/11    EXPLORATORY LAPAROTOMY,lysis of adhesions, EXCISION OF PELVIC    OVARY REMOVAL         Social History     Socioeconomic History    Marital status:      Spouse name: Not on file    Number of children: Not on file    Years of education: Not on file    Highest education level: Not on file   Occupational History    Not on file   Tobacco Use    Smoking status: Never    Smokeless tobacco: Never   Vaping Use    Vaping Use: Never used   Substance and Sexual Activity    Alcohol use: No     Alcohol/week: 0.0 standard drinks    Drug use: No    Sexual activity: Yes     Partners: Male     Birth control/protection: Surgical   Other Topics Concern    Not on file   Social History Narrative    Not on file     Social Determinants of Health     Financial Resource Strain: Not on file   Food Insecurity: Not on file   Transportation Needs: Not on file   Physical Activity: Not on file   Stress: Not on file Social Connections: Not on file   Intimate Partner Violence: Not on file   Housing Stability: Not on file       Family History   Problem Relation Age of Onset    Cancer Mother         Breast    High Blood Pressure Mother     Heart Disease Mother     Breast Cancer Mother     High Blood Pressure Father     Diabetes Father     High Blood Pressure Sister     High Blood Pressure Brother            Vitals:    10/14/22 1306   BP: 120/76   Pulse: 77   Resp: 16   Temp: 97.6 °F (36.4 °C)   SpO2: 98%   Weight: 207 lb (93.9 kg)     Estimated body mass index is 34.45 kg/m² as calculated from the following:    Height as of 8/31/22: 5' 5\" (1.651 m). Weight as of this encounter: 207 lb (93.9 kg). BP Readings from Last 5 Encounters:   10/14/22 120/76   08/31/22 (!) 141/95   09/03/21 (!) 124/92   01/13/20 122/80   09/09/19 122/64        Oriented to person, place, and time. HEENT:Normocephalic, atraumatic. No scleral icterus. Pupils normal  Heart: Regular Rate and Rhythm. No murmurs. Respirations: lungs clear to auscultation bilaterally. Abdomin: Bowel sounds present. Extremities: No peripheral edema. No erythema.         The 10-year ASCVD risk score (Tootie DK, et al., 2019) is: 2.9%    Values used to calculate the score:      Age: 62 years      Sex: Female      Is Non- : No      Diabetic: No      Tobacco smoker: No      Systolic Blood Pressure: 280 mmHg      Is BP treated: Yes      HDL Cholesterol: 52 mg/dL      Total Cholesterol: 191 mg/dL    Immunization History   Administered Date(s) Administered    COVID-19, PFIZER GRAY top, DO NOT Dilute, (age 15 y+), IM, 30 mcg/0.3 mL 08/24/2022    COVID-19, PFIZER PURPLE top, DILUTE for use, (age 15 y+), 30mcg/0.3mL 03/09/2021, 04/06/2021, 12/15/2021    Influenza Virus Vaccine 11/03/2021, 11/03/2021    Td, unspecified formulation 01/01/2003    Tdap (Boostrix, Adacel) 06/29/2009, 08/12/2019    Zoster Recombinant (Shingrix) 10/24/2019, 01/10/2020       Health Maintenance   Topic Date Due    Flu vaccine (1) 08/01/2022    Breast cancer screen  02/28/2023    Depression Screen  08/31/2023    Lipids  10/12/2027    DTaP/Tdap/Td vaccine (3 - Td or Tdap) 08/12/2029    Colorectal Cancer Screen  02/07/2032    Shingles vaccine  Completed    COVID-19 Vaccine  Completed    Hepatitis C screen  Completed    Hepatitis A vaccine  Aged Out    Hib vaccine  Aged Out    Meningococcal (ACWY) vaccine  Aged Out    Pneumococcal 0-64 years Vaccine  Aged Out    HIV screen  Discontinued               electronic signature was used to authenticate this note. This note is dictated, errors are possible.   --Donna Barton,  on 10/14/2022 at 1:21 PM    Due for physical:   Follow up from uncontrolled hypertension added lisinopril 5mg to dioban hct last visit in august

## 2022-10-14 ENCOUNTER — OFFICE VISIT (OUTPATIENT)
Dept: FAMILY MEDICINE CLINIC | Age: 57
End: 2022-10-14
Payer: OTHER GOVERNMENT

## 2022-10-14 VITALS
TEMPERATURE: 97.6 F | RESPIRATION RATE: 16 BRPM | DIASTOLIC BLOOD PRESSURE: 76 MMHG | WEIGHT: 207 LBS | BODY MASS INDEX: 34.45 KG/M2 | SYSTOLIC BLOOD PRESSURE: 120 MMHG | OXYGEN SATURATION: 98 % | HEART RATE: 77 BPM

## 2022-10-14 DIAGNOSIS — Z00.00 ANNUAL PHYSICAL EXAM: Primary | ICD-10-CM

## 2022-10-14 PROCEDURE — 99396 PREV VISIT EST AGE 40-64: CPT | Performed by: FAMILY MEDICINE

## 2022-10-14 PROCEDURE — 90674 CCIIV4 VAC NO PRSV 0.5 ML IM: CPT | Performed by: FAMILY MEDICINE

## 2022-10-14 PROCEDURE — 90471 IMMUNIZATION ADMIN: CPT | Performed by: FAMILY MEDICINE

## 2022-10-24 ENCOUNTER — NURSE TRIAGE (OUTPATIENT)
Dept: OTHER | Facility: CLINIC | Age: 57
End: 2022-10-24

## 2022-10-24 ENCOUNTER — TELEMEDICINE (OUTPATIENT)
Dept: FAMILY MEDICINE CLINIC | Age: 57
End: 2022-10-24
Payer: OTHER GOVERNMENT

## 2022-10-24 DIAGNOSIS — J32.9 SINOBRONCHITIS: Primary | ICD-10-CM

## 2022-10-24 DIAGNOSIS — J40 SINOBRONCHITIS: Primary | ICD-10-CM

## 2022-10-24 PROCEDURE — 99213 OFFICE O/P EST LOW 20 MIN: CPT | Performed by: NURSE PRACTITIONER

## 2022-10-24 RX ORDER — AMOXICILLIN AND CLAVULANATE POTASSIUM 875; 125 MG/1; MG/1
1 TABLET, FILM COATED ORAL 2 TIMES DAILY
Qty: 20 TABLET | Refills: 0 | Status: SHIPPED | OUTPATIENT
Start: 2022-10-24 | End: 2022-11-03

## 2022-10-24 RX ORDER — BENZONATATE 200 MG/1
200 CAPSULE ORAL 3 TIMES DAILY PRN
Qty: 30 CAPSULE | Refills: 0 | Status: SHIPPED | OUTPATIENT
Start: 2022-10-24 | End: 2022-11-17

## 2022-10-24 ASSESSMENT — ANXIETY QUESTIONNAIRES
2. NOT BEING ABLE TO STOP OR CONTROL WORRYING: 0
4. TROUBLE RELAXING: 0
3. WORRYING TOO MUCH ABOUT DIFFERENT THINGS: 0
7. FEELING AFRAID AS IF SOMETHING AWFUL MIGHT HAPPEN: 0
1. FEELING NERVOUS, ANXIOUS, OR ON EDGE: 0
GAD7 TOTAL SCORE: 0
5. BEING SO RESTLESS THAT IT IS HARD TO SIT STILL: 0
6. BECOMING EASILY ANNOYED OR IRRITABLE: 0
IF YOU CHECKED OFF ANY PROBLEMS ON THIS QUESTIONNAIRE, HOW DIFFICULT HAVE THESE PROBLEMS MADE IT FOR YOU TO DO YOUR WORK, TAKE CARE OF THINGS AT HOME, OR GET ALONG WITH OTHER PEOPLE: NOT DIFFICULT AT ALL

## 2022-10-24 ASSESSMENT — PATIENT HEALTH QUESTIONNAIRE - PHQ9
2. FEELING DOWN, DEPRESSED OR HOPELESS: 0
SUM OF ALL RESPONSES TO PHQ QUESTIONS 1-9: 0
SUM OF ALL RESPONSES TO PHQ9 QUESTIONS 1 & 2: 0
SUM OF ALL RESPONSES TO PHQ QUESTIONS 1-9: 0
1. LITTLE INTEREST OR PLEASURE IN DOING THINGS: 0
SUM OF ALL RESPONSES TO PHQ QUESTIONS 1-9: 0
SUM OF ALL RESPONSES TO PHQ QUESTIONS 1-9: 0

## 2022-10-24 ASSESSMENT — ENCOUNTER SYMPTOMS
SORE THROAT: 1
EYE PAIN: 0
ABDOMINAL PAIN: 0
SINUS PRESSURE: 1
TROUBLE SWALLOWING: 0
PHOTOPHOBIA: 0
NAUSEA: 0
RHINORRHEA: 0
COUGH: 1
CHEST TIGHTNESS: 1
VOMITING: 0
EYE ITCHING: 1
SHORTNESS OF BREATH: 0
EYE REDNESS: 1
WHEEZING: 1
DIARRHEA: 0

## 2022-10-24 NOTE — TELEPHONE ENCOUNTER
Location of patient: [address] call from [facility] at [facility] with [employer]. Subjective: Caller states \"It started with a sore throat. I had a severe headache, coughing, a slight fever 99F. I had some nasal congestion. It went into my lungs. I feel like this is bacteria. I tested myself again and it was negative for COVID. I have some mucus coming up and it is yellowish. Today I feel like it is down there and I can't get it up. \"     Current Symptoms: sore throat without pus on tonsils. Nasal congestion. Fatigue. States lymph nodes in left axilla tender. Onset: 5 days ago; worsening    Associated Symptoms: cough with yellow sputum. Headache. No SOB at rest, SOB with exertion. Intermittent wheezing, improves s/p coughing. Pain Severity: 4/10; tenderness; lymph node. No headache or chest pain. Temperature: 98F 30 mins prior to call  by forehead thermometer    What has been tried: Sudafed, Tylenol sinus. Cough medication. 2 home COVID tests, both negative. LMP: NA Pregnant: NA    Recommended disposition: Go to [facility]    Care advice provided, patient verbalizes understanding; denies any other questions or concerns; instructed to call back for any new or worsening symptoms. Patient/Caller agrees with recommended disposition; writer provided warm transfer to TINO Rendon at [facility] for appointment scheduling    Attention Provider: Thank you for allowing me to participate in the care of your patient. The patient was connected to triage in response to information provided to the ECC/PSC. Please do not respond through this encounter as the response is not directed to a shared pool.         Reason for Disposition   MILD difficulty breathing (e.g., minimal/no SOB at rest, SOB with walking, pulse <100) and still present when not coughing    Protocols used: Cough-ADULT-OH

## 2022-10-24 NOTE — PROGRESS NOTES
10/24/2022    TELEHEALTH EVALUATION -- Audio/Visual (During QTTJS-08 public health emergency)    HPI:    Eveline Nelson (:  1965) has requested an audio/video evaluation for the following concern(s):    Chief Complaint   Patient presents with    Cough     Started Friday Sore throat     Fatigue    Headache    Chest Congestion    Generalized Body Aches     Having wheezing and chest heaviness    Pharyngitis    Otalgia     Negative [employer]       [facility] is seen today for evaluation of illness. Symptoms started on in the middle of last wee. She notes pharyngitis, fatigue, headache, chest congestion, generalized myalgias,and bilateral otalgia. She has had a low grade fever for 1 day. She also notes nausea but no vomiting or diarrhea. Overall she feels like her symptoms are worsening. She has taken a covid test which was negative. Review of Systems   Constitutional:  Positive for fatigue and fever. Negative for activity change, chills and unexpected weight change. HENT:  Positive for congestion, ear pain, postnasal drip, sinus pressure and sore throat. Negative for drooling, ear discharge, rhinorrhea, sneezing, tinnitus and trouble swallowing. Eyes:  Positive for redness and itching. Negative for photophobia, pain and visual disturbance. Respiratory:  Positive for cough, chest tightness and wheezing. Negative for shortness of breath. Cardiovascular:  Negative for chest pain, palpitations and leg swelling. Gastrointestinal:  Negative for abdominal pain, diarrhea, nausea and vomiting. Endocrine: Negative. Genitourinary: Negative. Musculoskeletal:  Positive for myalgias. Skin: Negative. Allergic/Immunologic: Negative for environmental allergies and immunocompromised state. Neurological:  Positive for headaches. Negative for dizziness, syncope and weakness. Hematological: Negative. Psychiatric/Behavioral: Negative. Prior to Visit Medications    Medication Sig Taking?  Authorizing easy and even, able to speak in complete sentences without shortness of breath or audible wheezing   Musculoskeletal:      Cervical back: Normal range of motion. Skin:     Coloration: Skin is not jaundiced or pale. Neurological:      General: No focal deficit present. Mental Status: She is alert and oriented to person, place, and time. Psychiatric:         Mood and Affect: Mood normal.         Behavior: Behavior normal.         Thought Content: Thought content normal.         Judgment: Judgment normal.         ASSESSMENT/PLAN:    ICD-10-CM    1. Sinobronchitis  J32.9     J40         Start antibiotics  Continue supportive care  Push fluids  Tessalon pearls for cough  Report if fails to improve      Orders Placed This Encounter   Medications    amoxicillin-clavulanate (AUGMENTIN) 875-125 MG per tablet     Sig: Take 1 tablet by mouth 2 times daily for 10 days     Dispense:  20 tablet     Refill:  0    benzonatate (TESSALON) 200 MG capsule     Sig: Take 1 capsule by mouth 3 times daily as needed for Cough     Dispense:  30 capsule     Refill:  0       No orders of the defined types were placed in this encounter. Return if symptoms worsen or fail to improve. Ricky Barrientos is a 62 y.o. female  was evaluated through a synchronous (real-time) audio-video encounter. The patient (or guardian if applicable) is aware that this is a billable service, which includes applicable co-pays. This Virtual Visit was conducted with patient's (and/or legal guardian's) consent. The visit was conducted pursuant to the emergency declaration under the [address] [place] [address], [address] [place] [address] authority and the [employer] General Act. Patient identification was verified, and a caregiver was present when appropriate. The patient was located in a state where the provider was licensed to provide care.      Patient identification was verified at the start of the visit: Yes    Total time spent on this encounter: Not billed by time    Services were provided through a video synchronous discussion virtually to substitute for in-person clinic visit. Patient and provider were located at their individual homes. --TRE Chand CNP on 10/24/2022 at 9:28 AM    An electronic signature was used to authenticate this note.

## 2022-10-28 ENCOUNTER — TELEPHONE (OUTPATIENT)
Dept: FAMILY MEDICINE CLINIC | Age: 57
End: 2022-10-28

## 2022-10-28 NOTE — TELEPHONE ENCOUNTER
Pt had virtual appt on Monday and was treated for Sinobronchitis. She is still having some chest tightness on both sides and doesn't know if she should be concerned. She has azam BP and has been monitoring it, it has been good so far. Please advise what she should do. Last visit William on 10/24 VV    No future appointments.

## 2022-11-17 ENCOUNTER — OFFICE VISIT (OUTPATIENT)
Dept: FAMILY MEDICINE CLINIC | Age: 57
End: 2022-11-17
Payer: OTHER GOVERNMENT

## 2022-11-17 ENCOUNTER — HOSPITAL ENCOUNTER (OUTPATIENT)
Dept: GENERAL RADIOLOGY | Age: 57
Discharge: HOME | End: 2022-11-17
Payer: OTHER GOVERNMENT

## 2022-11-17 VITALS
TEMPERATURE: 97.1 F | HEIGHT: 65 IN | BODY MASS INDEX: 34.49 KG/M2 | WEIGHT: 207 LBS | DIASTOLIC BLOOD PRESSURE: 75 MMHG | HEART RATE: 72 BPM | OXYGEN SATURATION: 98 % | SYSTOLIC BLOOD PRESSURE: 118 MMHG

## 2022-11-17 DIAGNOSIS — M79.645 FINGER PAIN, LEFT: Primary | ICD-10-CM

## 2022-11-17 DIAGNOSIS — M79.645 FINGER PAIN, LEFT: ICD-10-CM

## 2022-11-17 PROCEDURE — 99213 OFFICE O/P EST LOW 20 MIN: CPT | Performed by: STUDENT IN AN ORGANIZED HEALTH CARE EDUCATION/TRAINING PROGRAM

## 2022-11-17 PROCEDURE — 3078F DIAST BP <80 MM HG: CPT | Performed by: STUDENT IN AN ORGANIZED HEALTH CARE EDUCATION/TRAINING PROGRAM

## 2022-11-17 PROCEDURE — 73140 X-RAY EXAM OF FINGER(S): CPT

## 2022-11-17 PROCEDURE — 3074F SYST BP LT 130 MM HG: CPT | Performed by: STUDENT IN AN ORGANIZED HEALTH CARE EDUCATION/TRAINING PROGRAM

## 2022-11-17 SDOH — ECONOMIC STABILITY: FOOD INSECURITY: WITHIN THE PAST 12 MONTHS, THE FOOD YOU BOUGHT JUST DIDN'T LAST AND YOU DIDN'T HAVE MONEY TO GET MORE.: NEVER TRUE

## 2022-11-17 SDOH — ECONOMIC STABILITY: FOOD INSECURITY: WITHIN THE PAST 12 MONTHS, YOU WORRIED THAT YOUR FOOD WOULD RUN OUT BEFORE YOU GOT MONEY TO BUY MORE.: NEVER TRUE

## 2022-11-17 ASSESSMENT — SOCIAL DETERMINANTS OF HEALTH (SDOH): HOW HARD IS IT FOR YOU TO PAY FOR THE VERY BASICS LIKE FOOD, HOUSING, MEDICAL CARE, AND HEATING?: NOT HARD AT ALL

## 2022-11-17 NOTE — PROGRESS NOTES
[address], [address] [place] [address] Road  Tel: [phone]      2022     Jessee Robins (:  1965) is a 62 y.o. female, here for evaluation of the following medical concerns:    HPI  Hand Injury   Incident onset: Patient fell last night onto her left hand and injured her left middle finger. The injury mechanism was a fall. Patient was going down the stairs, missed the last step, and grabbed the middle railing on the stair and injured the left middle finger. No head injury. Remainder of hand is unaffected. Pain in the 3rd  digit with movement, localized toward the bottom. At rest no pain. Difficulty with flexion. Had put ice on the finger, no improvement in swelling. Tried taping the finger. Took ibuprofen and tylenol, has been helping with pain . Review of Systems   Constitutional:  Negative for fatigue and fever. Respiratory:  Negative for shortness of breath. Cardiovascular:  Negative for chest pain. Neurological:  Negative for dizziness, weakness and light-headedness. All other systems reviewed and are negative. Prior to Visit Medications    Medication Sig Taking?  Authorizing Provider   valsartan-hydroCHLOROthiazide (DIOVAN-HCT) 80-12.5 MG per tablet TAKE 1 TABLET BY MOUTH DAILY Yes Maria Del Carmen Burnham DO   lisinopril (PRINIVIL;ZESTRIL) 5 MG tablet TAKE 1 TABLET BY MOUTH DAILY Yes Maria Del Carmen Burnham,    ketoconazole (NIZORAL) 2 % shampoo WASH EXTERNALLY TO THE SCALP 3 TIMES EVERY WEEK Yes Diana Montelongo MD   Multiple Vitamins-Minerals (MULTIVITAMIN ADULT PO) Take by mouth Yes Historical Provider, MD   Cholecalciferol (VITAMIN D3) 5000 UNITS CAPS Take 1 capsule by mouth daily Yes Historical Provider, MD        Social History     Tobacco Use    Smoking status: Never    Smokeless tobacco: Never   Substance Use Topics    Alcohol use: No     Alcohol/week: 0.0 standard drinks      Physical exam  /75 (Site: Left Upper Arm, Position: Sitting, Cuff Size: Large Adult)   Pulse 72   Temp 97.1 °F (36.2 °C)   Ht 5' 5\" (1.651 m)   Wt 207 lb (93.9 kg)   SpO2 98%   BMI 34.45 kg/m²     Physical Exam  Constitutional:       General: She is not in acute distress. HENT:      Head: Normocephalic and atraumatic. Eyes:      Conjunctiva/sclera: Conjunctivae normal.   Cardiovascular:      Rate and Rhythm: Normal rate and regular rhythm. Pulses: Normal pulses. Heart sounds: Normal heart sounds. Pulmonary:      Effort: Pulmonary effort is normal.      Breath sounds: Normal breath sounds. Abdominal:      General: Abdomen is flat. Musculoskeletal:         General: Swelling present. Right hand: Swelling and tenderness present. No bony tenderness. Decreased range of motion. Normal sensation. Normal capillary refill. Normal pulse. Comments: Left hand 3rd  digit , swelling around DIP AND MCP joint, no ecchymosis, no bleeding, no erythema  Limited flexion   Sensation intact      Skin:     General: Skin is warm and dry. Capillary Refill: Capillary refill takes less than 2 seconds. Findings: No bruising or erythema. Neurological:      General: No focal deficit present. Psychiatric:         Mood and Affect: Mood normal.       ASSESSMENT/PLAN:  1. Finger pain, left  Pain and swelling in left 3 rd digit. Likely sprain vs fracture    - X-ray to rule out fracture   - If no fracture advised to tape to adjacent finger   -rest the injured area as much as practical, apply ice packs, elevate the injured limb, referral to Orthopedics for this injury  - Follow up with orthopedics if no improvement in 1-2 weeks or there is evidence of fracture on x-ray       - XR FINGER LEFT (MIN 2 VIEWS); Future  - [facility], [address], Non-Surgical Hand Surgery (Hand, Wrist, Upper Extremity), [facility]    No follow-ups on file. An electronic signature was used to authenticate this note.     --Gissell Ballard MD

## 2022-11-17 NOTE — PATIENT INSTRUCTIONS
Keep your hand raised above the level of your heart as much as you can. This will help reduce swelling. Put ice or a cold pack on your finger for 10 to 20 minutes at a time. Try to do this every 1 to 2 hours for the next 3 days (when you are awake) or until the swelling goes down. =.  Take pain medicines exactly as directed. If the doctor gave you a prescription medicine for pain, take it as prescribed. If you are not taking a prescription pain medicine, ask your doctor if you can take an over-the-counter medicine.

## 2022-11-20 ASSESSMENT — ENCOUNTER SYMPTOMS: SHORTNESS OF BREATH: 0

## 2023-03-01 ENCOUNTER — HOSPITAL ENCOUNTER (OUTPATIENT)
Dept: WOMENS IMAGING | Age: 58
Discharge: HOME | End: 2023-03-01
Payer: OTHER GOVERNMENT

## 2023-03-01 VITALS — BODY MASS INDEX: 34.16 KG/M2 | WEIGHT: 205 LBS | HEIGHT: 65 IN

## 2023-03-01 DIAGNOSIS — Z12.31 VISIT FOR SCREENING MAMMOGRAM: ICD-10-CM

## 2023-03-01 PROCEDURE — 77067 SCR MAMMO BI INCL CAD: CPT

## 2023-07-05 NOTE — PROGRESS NOTES
07/07/23      Barbra Robins  Bp check, headache, knee pain         Diagnosis Orders   1. Essential hypertension  valsartan-hydroCHLOROthiazide (DIOVAN-HCT) 80-12.5 MG per tablet      2. Nonintractable headache, unspecified chronicity pattern, unspecified headache type          Diagnoses and all orders for this visit:    Essential hypertension  She stopped her lisinoprol due to lightheadedness and cough,   Typically controlled, will check it at home, recheck here October, do blood work then  -     valsartan-hydroCHLOROthiazide (DIOVAN-HCT) 80-12.5 MG per tablet; TAKE 1 TABLET BY MOUTH DAILY  Nonintractable headache, unspecified chronicity pattern, unspecified headache type, suspecteexacerbation from cervical spine tension      -     butalbital-acetaminophen-caffeine (FIORICET, ESGIC) -40 MG per tablet; Take 1 tablet by mouth every 4 hours as needed for Headaches    Knee pain, bilateral:chronic recurrent. recommend voltaren gel. exercises  Diagnoses and all orders for this visit:        HPI  See scan.     Past Medical History:   Diagnosis Date    Hypertension     Low back pain     Nausea & vomiting     Ovarian tumor (benign)      Social History     Socioeconomic History    Marital status:      Spouse name: Not on file    Number of children: Not on file    Years of education: Not on file    Highest education level: Not on file   Occupational History    Not on file   Tobacco Use    Smoking status: Never    Smokeless tobacco: Never   Vaping Use    Vaping Use: Never used   Substance and Sexual Activity    Alcohol use: No     Alcohol/week: 0.0 standard drinks    Drug use: No    Sexual activity: Yes     Partners: Male     Birth control/protection: Surgical   Other Topics Concern    Not on file   Social History Narrative    Not on file     Social Determinants of Health     Financial Resource Strain: Low Risk     Difficulty of Paying Living Expenses: Not hard at all   Food Insecurity: No Food Insecurity

## 2023-07-07 ENCOUNTER — OFFICE VISIT (OUTPATIENT)
Dept: FAMILY MEDICINE CLINIC | Age: 58
End: 2023-07-07
Payer: OTHER GOVERNMENT

## 2023-07-07 ENCOUNTER — TELEPHONE (OUTPATIENT)
Dept: FAMILY MEDICINE CLINIC | Age: 58
End: 2023-07-07

## 2023-07-07 DIAGNOSIS — I10 ESSENTIAL HYPERTENSION: Primary | ICD-10-CM

## 2023-07-07 DIAGNOSIS — R51.9 NONINTRACTABLE HEADACHE, UNSPECIFIED CHRONICITY PATTERN, UNSPECIFIED HEADACHE TYPE: ICD-10-CM

## 2023-07-07 DIAGNOSIS — Z00.00 ANNUAL PHYSICAL EXAM: Primary | ICD-10-CM

## 2023-07-07 PROCEDURE — 99214 OFFICE O/P EST MOD 30 MIN: CPT | Performed by: FAMILY MEDICINE

## 2023-07-07 RX ORDER — VALSARTAN AND HYDROCHLOROTHIAZIDE 80; 12.5 MG/1; MG/1
TABLET, FILM COATED ORAL
Qty: 90 TABLET | Refills: 3 | Status: SHIPPED | OUTPATIENT
Start: 2023-07-07

## 2023-07-07 RX ORDER — BUTALBITAL, ACETAMINOPHEN AND CAFFEINE 50; 325; 40 MG/1; MG/1; MG/1
1 TABLET ORAL EVERY 4 HOURS PRN
Qty: 180 TABLET | Refills: 3 | Status: SHIPPED | OUTPATIENT
Start: 2023-07-07

## 2023-07-07 SDOH — ECONOMIC STABILITY: FOOD INSECURITY: WITHIN THE PAST 12 MONTHS, THE FOOD YOU BOUGHT JUST DIDN'T LAST AND YOU DIDN'T HAVE MONEY TO GET MORE.: NEVER TRUE

## 2023-07-07 SDOH — ECONOMIC STABILITY: INCOME INSECURITY: HOW HARD IS IT FOR YOU TO PAY FOR THE VERY BASICS LIKE FOOD, HOUSING, MEDICAL CARE, AND HEATING?: NOT HARD AT ALL

## 2023-07-07 SDOH — ECONOMIC STABILITY: HOUSING INSECURITY
IN THE LAST 12 MONTHS, WAS THERE A TIME WHEN YOU DID NOT HAVE A STEADY PLACE TO SLEEP OR SLEPT IN A SHELTER (INCLUDING NOW)?: NO

## 2023-07-07 SDOH — ECONOMIC STABILITY: FOOD INSECURITY: WITHIN THE PAST 12 MONTHS, YOU WORRIED THAT YOUR FOOD WOULD RUN OUT BEFORE YOU GOT MONEY TO BUY MORE.: NEVER TRUE

## 2023-07-07 ASSESSMENT — PATIENT HEALTH QUESTIONNAIRE - PHQ9
2. FEELING DOWN, DEPRESSED OR HOPELESS: 0
SUM OF ALL RESPONSES TO PHQ QUESTIONS 1-9: 0
SUM OF ALL RESPONSES TO PHQ9 QUESTIONS 1 & 2: 0
SUM OF ALL RESPONSES TO PHQ QUESTIONS 1-9: 0
1. LITTLE INTEREST OR PLEASURE IN DOING THINGS: 0

## 2023-07-07 NOTE — TELEPHONE ENCOUNTER
Upon check out, patient asked if it is better for her to get her blood work done in advance prior to her 3 month f/u in October. She also had questions regarding her yearly all-well visit.

## 2023-09-28 DIAGNOSIS — I10 ESSENTIAL HYPERTENSION: ICD-10-CM

## 2023-09-29 RX ORDER — VALSARTAN AND HYDROCHLOROTHIAZIDE 80; 12.5 MG/1; MG/1
TABLET, FILM COATED ORAL
Qty: 90 TABLET | Refills: 3 | Status: SHIPPED | OUTPATIENT
Start: 2023-09-29

## 2023-10-04 DIAGNOSIS — Z00.00 ANNUAL PHYSICAL EXAM: ICD-10-CM

## 2023-10-04 LAB
ALBUMIN SERPL-MCNC: 4.4 G/DL (ref 3.4–5)
ALBUMIN/GLOB SERPL: 2.3 {RATIO} (ref 1.1–2.2)
ALP SERPL-CCNC: 44 U/L (ref 40–129)
ALT SERPL-CCNC: 16 U/L (ref 10–40)
ANION GAP SERPL CALCULATED.3IONS-SCNC: 6 MMOL/L (ref 3–16)
AST SERPL-CCNC: 14 U/L (ref 15–37)
BILIRUB SERPL-MCNC: 0.4 MG/DL (ref 0–1)
BUN SERPL-MCNC: 17 MG/DL (ref 7–20)
CALCIUM SERPL-MCNC: 9 MG/DL (ref 8.3–10.6)
CHLORIDE SERPL-SCNC: 106 MMOL/L (ref 99–110)
CHOLEST SERPL-MCNC: 184 MG/DL (ref 0–199)
CO2 SERPL-SCNC: 27 MMOL/L (ref 21–32)
CREAT SERPL-MCNC: 0.6 MG/DL (ref 0.6–1.1)
DEPRECATED RDW RBC AUTO: 14.1 % (ref 12.4–15.4)
GFR SERPLBLD CREATININE-BSD FMLA CKD-EPI: >60 ML/MIN/{1.73_M2}
GLUCOSE SERPL-MCNC: 99 MG/DL (ref 70–99)
HCT VFR BLD AUTO: 41.4 % (ref 36–48)
HDLC SERPL-MCNC: 55 MG/DL (ref 40–60)
HGB BLD-MCNC: 13.9 G/DL (ref 12–16)
LDLC SERPL CALC-MCNC: 102 MG/DL
MCH RBC QN AUTO: 29.9 PG (ref 26–34)
MCHC RBC AUTO-ENTMCNC: 33.7 G/DL (ref 31–36)
MCV RBC AUTO: 88.6 FL (ref 80–100)
PLATELET # BLD AUTO: 246 K/UL (ref 135–450)
PMV BLD AUTO: 8.3 FL (ref 5–10.5)
POTASSIUM SERPL-SCNC: 4.2 MMOL/L (ref 3.5–5.1)
PROT SERPL-MCNC: 6.3 G/DL (ref 6.4–8.2)
RBC # BLD AUTO: 4.67 M/UL (ref 4–5.2)
SODIUM SERPL-SCNC: 139 MMOL/L (ref 136–145)
TRIGL SERPL-MCNC: 136 MG/DL (ref 0–150)
VLDLC SERPL CALC-MCNC: 27 MG/DL
WBC # BLD AUTO: 5.2 K/UL (ref 4–11)

## 2023-10-11 NOTE — PROGRESS NOTES
HDL Cholesterol: 55 mg/dL      Total Cholesterol: 184 mg/dL    Immunization History   Administered Date(s) Administered    COVID-19, PFIZER GRAY top, DO NOT Dilute, (age 15 y+), IM, 30 mcg/0.3 mL 08/24/2022    COVID-19, PFIZER PURPLE top, DILUTE for use, (age 15 y+), 30mcg/0.3mL 03/09/2021, 04/06/2021, 12/15/2021    Influenza Virus Vaccine 11/03/2021, 11/03/2021    Influenza, FLUCELVAX, (age 10 mo+), MDCK, PF, 0.5mL 10/14/2022    TDaP, ADACEL (age 6y-58y), BOOSTRIX (age 10y+), IM, 0.5mL 06/29/2009, 08/12/2019    Td, unspecified formulation 01/01/2003    Zoster Recombinant (Shingrix) 10/24/2019, 01/10/2020       Health Maintenance   Topic Date Due    Hepatitis B vaccine (1 of 3 - 3-dose series) Never done    COVID-19 Vaccine (5 - Pfizer series) 10/19/2022    Flu vaccine (1) 08/01/2023    Breast cancer screen  03/01/2024    Depression Screen  10/12/2024    Lipids  10/04/2028    DTaP/Tdap/Td vaccine (3 - Td or Tdap) 08/12/2029    Colorectal Cancer Screen  02/07/2032    Shingles vaccine  Completed    Hepatitis C screen  Completed    Hepatitis A vaccine  Aged Out    Hib vaccine  Aged Out    Meningococcal (ACWY) vaccine  Aged Out    Pneumococcal 0-64 years Vaccine  Aged Out    HIV screen  Discontinued               electronic signature was used to authenticate this note. This note is dictated, errors are possible.   --Cali Garcia, DO on 10/13/2023 at 9:38 AM

## 2023-10-12 SDOH — ECONOMIC STABILITY: FOOD INSECURITY: WITHIN THE PAST 12 MONTHS, YOU WORRIED THAT YOUR FOOD WOULD RUN OUT BEFORE YOU GOT MONEY TO BUY MORE.: NEVER TRUE

## 2023-10-12 SDOH — ECONOMIC STABILITY: FOOD INSECURITY: WITHIN THE PAST 12 MONTHS, THE FOOD YOU BOUGHT JUST DIDN'T LAST AND YOU DIDN'T HAVE MONEY TO GET MORE.: NEVER TRUE

## 2023-10-12 SDOH — ECONOMIC STABILITY: TRANSPORTATION INSECURITY
IN THE PAST 12 MONTHS, HAS LACK OF TRANSPORTATION KEPT YOU FROM MEETINGS, WORK, OR FROM GETTING THINGS NEEDED FOR DAILY LIVING?: NO

## 2023-10-12 SDOH — ECONOMIC STABILITY: INCOME INSECURITY: HOW HARD IS IT FOR YOU TO PAY FOR THE VERY BASICS LIKE FOOD, HOUSING, MEDICAL CARE, AND HEATING?: NOT HARD AT ALL

## 2023-10-12 ASSESSMENT — PATIENT HEALTH QUESTIONNAIRE - PHQ9
SUM OF ALL RESPONSES TO PHQ9 QUESTIONS 1 & 2: 0
2. FEELING DOWN, DEPRESSED OR HOPELESS: NOT AT ALL
1. LITTLE INTEREST OR PLEASURE IN DOING THINGS: 0
SUM OF ALL RESPONSES TO PHQ QUESTIONS 1-9: 0
SUM OF ALL RESPONSES TO PHQ QUESTIONS 1-9: 0
1. LITTLE INTEREST OR PLEASURE IN DOING THINGS: NOT AT ALL
SUM OF ALL RESPONSES TO PHQ9 QUESTIONS 1 & 2: 0
SUM OF ALL RESPONSES TO PHQ QUESTIONS 1-9: 0
2. FEELING DOWN, DEPRESSED OR HOPELESS: 0
SUM OF ALL RESPONSES TO PHQ QUESTIONS 1-9: 0

## 2023-10-13 ENCOUNTER — OFFICE VISIT (OUTPATIENT)
Dept: FAMILY MEDICINE CLINIC | Age: 58
End: 2023-10-13
Payer: OTHER GOVERNMENT

## 2023-10-13 VITALS
HEART RATE: 80 BPM | SYSTOLIC BLOOD PRESSURE: 128 MMHG | RESPIRATION RATE: 14 BRPM | OXYGEN SATURATION: 94 % | HEIGHT: 65 IN | WEIGHT: 217 LBS | TEMPERATURE: 97 F | DIASTOLIC BLOOD PRESSURE: 78 MMHG | BODY MASS INDEX: 36.15 KG/M2

## 2023-10-13 DIAGNOSIS — Z00.00 ANNUAL PHYSICAL EXAM: Primary | ICD-10-CM

## 2023-10-13 PROCEDURE — 3078F DIAST BP <80 MM HG: CPT | Performed by: FAMILY MEDICINE

## 2023-10-13 PROCEDURE — 99396 PREV VISIT EST AGE 40-64: CPT | Performed by: FAMILY MEDICINE

## 2023-10-13 PROCEDURE — 3074F SYST BP LT 130 MM HG: CPT | Performed by: FAMILY MEDICINE

## 2024-01-15 ENCOUNTER — TELEPHONE (OUTPATIENT)
Dept: FAMILY MEDICINE CLINIC | Age: 59
End: 2024-01-15

## 2024-01-15 NOTE — TELEPHONE ENCOUNTER
Patient fell on Thursday while in [place], patient still in [place] till Friday. Patient said her buttocks is red and painful going into her lower spine. She has been taking tylenol and wants to know if she should put cold and hot compression on her back and buttocks.   Patient made appointment on Friday with Dr. Coburn when she returns home.

## 2024-01-16 ASSESSMENT — PATIENT HEALTH QUESTIONNAIRE - PHQ9
SUM OF ALL RESPONSES TO PHQ QUESTIONS 1-9: 0
2. FEELING DOWN, DEPRESSED OR HOPELESS: NOT AT ALL
SUM OF ALL RESPONSES TO PHQ QUESTIONS 1-9: 0
1. LITTLE INTEREST OR PLEASURE IN DOING THINGS: NOT AT ALL
2. FEELING DOWN, DEPRESSED OR HOPELESS: 0
SUM OF ALL RESPONSES TO PHQ QUESTIONS 1-9: 0
SUM OF ALL RESPONSES TO PHQ9 QUESTIONS 1 & 2: 0
1. LITTLE INTEREST OR PLEASURE IN DOING THINGS: 0
SUM OF ALL RESPONSES TO PHQ QUESTIONS 1-9: 0
SUM OF ALL RESPONSES TO PHQ9 QUESTIONS 1 & 2: 0

## 2024-01-19 ENCOUNTER — OFFICE VISIT (OUTPATIENT)
Dept: FAMILY MEDICINE CLINIC | Age: 59
End: 2024-01-19
Payer: OTHER GOVERNMENT

## 2024-01-19 ENCOUNTER — HOSPITAL ENCOUNTER (OUTPATIENT)
Dept: GENERAL RADIOLOGY | Age: 59
Discharge: HOME | End: 2024-01-19
Payer: OTHER GOVERNMENT

## 2024-01-19 VITALS
SYSTOLIC BLOOD PRESSURE: 124 MMHG | TEMPERATURE: 97.1 F | OXYGEN SATURATION: 97 % | BODY MASS INDEX: 36.82 KG/M2 | HEART RATE: 73 BPM | DIASTOLIC BLOOD PRESSURE: 80 MMHG | RESPIRATION RATE: 16 BRPM | WEIGHT: 221 LBS | HEIGHT: 65 IN

## 2024-01-19 DIAGNOSIS — M51.36 DDD (DEGENERATIVE DISC DISEASE), LUMBAR: ICD-10-CM

## 2024-01-19 DIAGNOSIS — I10 ESSENTIAL HYPERTENSION: ICD-10-CM

## 2024-01-19 DIAGNOSIS — M54.16 LUMBAR BACK PAIN WITH RADICULOPATHY AFFECTING RIGHT LOWER EXTREMITY: ICD-10-CM

## 2024-01-19 DIAGNOSIS — Z91.81 HISTORY OF RECENT FALL: ICD-10-CM

## 2024-01-19 DIAGNOSIS — M54.16 LUMBAR BACK PAIN WITH RADICULOPATHY AFFECTING RIGHT LOWER EXTREMITY: Primary | ICD-10-CM

## 2024-01-19 PROCEDURE — 3079F DIAST BP 80-89 MM HG: CPT | Performed by: STUDENT IN AN ORGANIZED HEALTH CARE EDUCATION/TRAINING PROGRAM

## 2024-01-19 PROCEDURE — 72100 X-RAY EXAM L-S SPINE 2/3 VWS: CPT

## 2024-01-19 PROCEDURE — 3074F SYST BP LT 130 MM HG: CPT | Performed by: STUDENT IN AN ORGANIZED HEALTH CARE EDUCATION/TRAINING PROGRAM

## 2024-01-19 PROCEDURE — 99214 OFFICE O/P EST MOD 30 MIN: CPT | Performed by: STUDENT IN AN ORGANIZED HEALTH CARE EDUCATION/TRAINING PROGRAM

## 2024-01-19 RX ORDER — METHYLPREDNISOLONE 4 MG/1
TABLET ORAL
Qty: 1 KIT | Refills: 0 | Status: SHIPPED | OUTPATIENT
Start: 2024-01-19 | End: 2024-01-25

## 2024-01-19 SDOH — ECONOMIC STABILITY: FOOD INSECURITY: WITHIN THE PAST 12 MONTHS, YOU WORRIED THAT YOUR FOOD WOULD RUN OUT BEFORE YOU GOT MONEY TO BUY MORE.: NEVER TRUE

## 2024-01-19 SDOH — ECONOMIC STABILITY: FOOD INSECURITY: WITHIN THE PAST 12 MONTHS, THE FOOD YOU BOUGHT JUST DIDN'T LAST AND YOU DIDN'T HAVE MONEY TO GET MORE.: NEVER TRUE

## 2024-01-19 SDOH — ECONOMIC STABILITY: INCOME INSECURITY: HOW HARD IS IT FOR YOU TO PAY FOR THE VERY BASICS LIKE FOOD, HOUSING, MEDICAL CARE, AND HEATING?: NOT HARD AT ALL

## 2024-01-19 NOTE — PROGRESS NOTES
[facility]  [address]  [address]  [place], [place] [address]  Tel: [phone]      2024   SUBJECTIVE/OBJECTIVE  HPI    Barbra Robins (:  1965) is a 58 y.o. female, here for evaluation of the following medical concerns:  Chief Complaint   Patient presents with    Fall     Fell on wood fishing dock X 8 days ago   Patient is a 58 y.o. female  has a past medical history of COVID-19, Hypertension, Low back pain, Nausea & vomiting, and Ovarian tumor (benign). who presents after a fall.      Patient reports that she fell in Thursday while in [place].  Flew back today, endorses redness and pain into her lower spine. Pain at night, tossing and turning. Sittiing worse than standing.     Fall  The accident occurred 5 to 7 days ago (Thursday of last week). The fall occurred while walking (while fishing, on the platform fell straight on the bottom.). She fell from a height of 3 to 5 ft. She landed on Hard floor (dock). There was no blood loss. The point of impact was the buttocks. The pain is present in the buttocks and back. The pain is at a severity of 8/10. The pain is moderate. The symptoms are aggravated by ambulation and pressure on injury (walking stairs). Associated symptoms include numbness (right leg --> knee) and tingling (right knee). Pertinent negatives include no abdominal pain, bowel incontinence, fever, headaches, hematuria, loss of consciousness, nausea, visual change or vomiting. Associated symptoms comments: Bruising . Weakness in right leg after the fall.. She has tried ice and heat (Not taking any pain medications.  Has tried Tylenol and ibuprofen.) for the symptoms. The treatment provided mild relief.   Seen by Dr Clifford Valentino in the past for bluging disc DDD, treated [facility] shts.        Hypertension: Taking valsartan hydrochlorothiazide 80-12.5 mg daily.  Blood pressure more well-controlled       Review of Systems   Constitutional:  Negative for fever.

## 2024-01-22 SDOH — HEALTH STABILITY: PHYSICAL HEALTH: ON AVERAGE, HOW MANY MINUTES DO YOU ENGAGE IN EXERCISE AT THIS LEVEL?: 30 MIN

## 2024-01-22 SDOH — HEALTH STABILITY: PHYSICAL HEALTH: ON AVERAGE, HOW MANY DAYS PER WEEK DO YOU ENGAGE IN MODERATE TO STRENUOUS EXERCISE (LIKE A BRISK WALK)?: 3 DAYS

## 2024-01-23 ENCOUNTER — OFFICE VISIT (OUTPATIENT)
Dept: ORTHOPEDIC SURGERY | Age: 59
End: 2024-01-23
Payer: OTHER GOVERNMENT

## 2024-01-23 VITALS — BODY MASS INDEX: 36.82 KG/M2 | WEIGHT: 221 LBS | HEIGHT: 65 IN

## 2024-01-23 DIAGNOSIS — M51.36 DDD (DEGENERATIVE DISC DISEASE), LUMBAR: ICD-10-CM

## 2024-01-23 DIAGNOSIS — M43.16 ANTEROLISTHESIS OF LUMBAR SPINE: ICD-10-CM

## 2024-01-23 DIAGNOSIS — S32.2XXA CLOSED FRACTURE OF COCCYX, INITIAL ENCOUNTER (HCC): Primary | ICD-10-CM

## 2024-01-23 PROCEDURE — 99204 OFFICE O/P NEW MOD 45 MIN: CPT | Performed by: PHYSICIAN ASSISTANT

## 2024-01-23 NOTE — PROGRESS NOTES
New Patient: LUMBAR SPINE    Referring Provider:  Jamil Coburn MD    CHIEF COMPLAINT:    Chief Complaint   Patient presents with    Back Pain     lumbar       HISTORY OF PRESENT ILLNESS:       Ms. Barbra Robins  is a pleasant 58 y.o. female here for evaluation regarding her LBP, coccygeal pain, and right leg pain. She states her pain began after a fall landing onto her buttocks approximately 1 to 2 weeks ago while in [place].  She states that she fell from a height of approximately 3 to 5 feet landing onto a dock on her buttocks.  She states that the severity of her pain ranges from 8-10/10.  She finds that her pain is worse with prolonged sitting on a firm surface, standing and walking.  She has numbness and tingling in the right leg to her knee and has a sense of weakness into the right lower extremity.  She denies any bowel or bladder dysfunction or saddle anesthesia.  She has been icing alternating with heat and has been taking NSAID for the symptoms.  She was recently seen by her primary care physician who did place her on a Medrol Dosepak that she is currently taking.    Pain Assessment  Location of Pain: Back  Location Modifiers: Other (Comment)  Severity of Pain: 8  Quality of Pain: Other (Comment)  Duration of Pain: Other (Comment)  Frequency of Pain: Other (Comment)  Aggravating Factors: Other (Comment)]  Current/Past Treatment:   Physical Therapy: None  Chiropractic: None  Injection: None  Medications: MDP    Past Medical History:   Past Medical History:   Diagnosis Date    COVID-19     Hypertension     Low back pain     Nausea & vomiting     Ovarian tumor (benign)         Past Surgical History:     Past Surgical History:   Procedure Laterality Date    BREAST LUMPECTOMY Left 1988    COLONOSCOPY  1/11/2012    hyperpl    CYSTOSCOPY  2002/2003    FOOT SURGERY      HYSTERECTOMY (CERVIX STATUS UNKNOWN)      HYSTERECTOMY, TOTAL ABDOMINAL (CERVIX REMOVED)      OTHER SURGICAL HISTORY  10/31/11

## 2024-01-29 ENCOUNTER — TELEPHONE (OUTPATIENT)
Dept: FAMILY MEDICINE CLINIC | Age: 59
End: 2024-01-29

## 2024-01-29 DIAGNOSIS — S90.519A ABRASION OF ANKLE, UNSPECIFIED LATERALITY, INITIAL ENCOUNTER: Primary | ICD-10-CM

## 2024-01-29 NOTE — TELEPHONE ENCOUNTER
Please address Dr. Hernandez is out of the office. Patient twisted her ankle. She has had foot surgery on that ankle in the past. She has an appointment this afternoon with Dr. Hayes at the center for foot and ankle care. They need a referral sent . Please sign pended referral

## 2024-03-01 ENCOUNTER — HOSPITAL ENCOUNTER (OUTPATIENT)
Dept: WOMENS IMAGING | Age: 59
Discharge: HOME | End: 2024-03-01
Payer: OTHER GOVERNMENT

## 2024-03-01 VITALS — WEIGHT: 210 LBS | BODY MASS INDEX: 34.99 KG/M2 | HEIGHT: 65 IN

## 2024-03-01 DIAGNOSIS — Z12.31 SCREENING MAMMOGRAM FOR BREAST CANCER: ICD-10-CM

## 2024-03-01 PROCEDURE — 77063 BREAST TOMOSYNTHESIS BI: CPT

## 2024-07-22 SDOH — HEALTH STABILITY: PHYSICAL HEALTH: ON AVERAGE, HOW MANY DAYS PER WEEK DO YOU ENGAGE IN MODERATE TO STRENUOUS EXERCISE (LIKE A BRISK WALK)?: 3 DAYS

## 2024-07-22 SDOH — HEALTH STABILITY: PHYSICAL HEALTH: ON AVERAGE, HOW MANY MINUTES DO YOU ENGAGE IN EXERCISE AT THIS LEVEL?: 40 MIN

## 2024-07-25 ENCOUNTER — OFFICE VISIT (OUTPATIENT)
Dept: FAMILY MEDICINE CLINIC | Age: 59
End: 2024-07-25

## 2024-07-25 VITALS
BODY MASS INDEX: 35.78 KG/M2 | OXYGEN SATURATION: 97 % | DIASTOLIC BLOOD PRESSURE: 70 MMHG | WEIGHT: 215 LBS | HEART RATE: 77 BPM | TEMPERATURE: 97.3 F | SYSTOLIC BLOOD PRESSURE: 132 MMHG

## 2024-07-25 DIAGNOSIS — Z00.00 ENCOUNTER FOR MEDICAL EXAMINATION TO ESTABLISH CARE: ICD-10-CM

## 2024-07-25 DIAGNOSIS — H69.92 DYSFUNCTION OF LEFT EUSTACHIAN TUBE: ICD-10-CM

## 2024-07-25 DIAGNOSIS — Z13.220 SCREENING FOR LIPID DISORDERS: ICD-10-CM

## 2024-07-25 DIAGNOSIS — E55.9 VITAMIN D DEFICIENCY: ICD-10-CM

## 2024-07-25 DIAGNOSIS — Z13.1 DIABETES MELLITUS SCREENING: ICD-10-CM

## 2024-07-25 DIAGNOSIS — G43.809 OTHER MIGRAINE WITHOUT STATUS MIGRAINOSUS, NOT INTRACTABLE: ICD-10-CM

## 2024-07-25 DIAGNOSIS — I10 ESSENTIAL HYPERTENSION: Primary | ICD-10-CM

## 2024-07-25 SDOH — ECONOMIC STABILITY: INCOME INSECURITY: HOW HARD IS IT FOR YOU TO PAY FOR THE VERY BASICS LIKE FOOD, HOUSING, MEDICAL CARE, AND HEATING?: NOT VERY HARD

## 2024-07-25 SDOH — ECONOMIC STABILITY: FOOD INSECURITY: WITHIN THE PAST 12 MONTHS, THE FOOD YOU BOUGHT JUST DIDN'T LAST AND YOU DIDN'T HAVE MONEY TO GET MORE.: NEVER TRUE

## 2024-07-25 SDOH — ECONOMIC STABILITY: FOOD INSECURITY: WITHIN THE PAST 12 MONTHS, YOU WORRIED THAT YOUR FOOD WOULD RUN OUT BEFORE YOU GOT MONEY TO BUY MORE.: NEVER TRUE

## 2024-07-25 ASSESSMENT — PATIENT HEALTH QUESTIONNAIRE - PHQ9
1. LITTLE INTEREST OR PLEASURE IN DOING THINGS: NOT AT ALL
SUM OF ALL RESPONSES TO PHQ QUESTIONS 1-9: 0
SUM OF ALL RESPONSES TO PHQ QUESTIONS 1-9: 0
2. FEELING DOWN, DEPRESSED OR HOPELESS: NOT AT ALL
SUM OF ALL RESPONSES TO PHQ QUESTIONS 1-9: 0
SUM OF ALL RESPONSES TO PHQ QUESTIONS 1-9: 0
SUM OF ALL RESPONSES TO PHQ9 QUESTIONS 1 & 2: 0

## 2024-07-25 NOTE — PATIENT INSTRUCTIONS
Eustachian tube dysfunction,  - warm compresses prn to the ears,  Use Flonase or Nasonex nasal spray once daily     . Annual physical exam  -Chart/records reviewed, history and physical performed, health maintenance addressed and updated, presenting problems addressed.  -Recommend 150 minutes of cardiovascular activity a week, or 10,000 to 15,000 steps a day, 2 days of weightbearing  -Encourage healthy diet,avoid processed/refined carbohydrates   Health Maintenance Due   Topic Date Due    Hepatitis B vaccine (1 of 3 - 3-dose series) Never done    COVID-19 Vaccine (5 - 2023-24 season) 09/01/2023     - call insurance ask if annual has to be in 12 month , last 10/2023   - Call back (billing as a annual)

## 2024-07-25 NOTE — PROGRESS NOTES
change in management, ??continue current ? medications  Continue dietary efforts and physical activity.     -     CBC with Auto Differential; Future  -     Comprehensive Metabolic Panel; Future  -     TSH with Reflex; Future    Screening for lipid disorders  -     Lipid, Fasting; Future  Dysfunction of left eustachian tube  - warm compresses prn to the ears,  Use Flonase or Nasonex nasal spray once daily   Other migraine without status migrainosus, not intractable  Controlled w/ abortive treatment prn. Fioricet as needed 2 x a month. Has associated nausea.   No need for preventive treatment at this point.  Diabetes mellitus screening  -     Hemoglobin A1C; Future                Return in about 1 year (around 7/25/2025), or if symptoms worsen or fail to improve.  Patient Instructions   Eustachian tube dysfunction,  - warm compresses prn to the ears,  Use Flonase or Nasonex nasal spray once daily     . Annual physical exam  -Chart/records reviewed, history and physical performed, health maintenance addressed and updated, presenting problems addressed.  -Recommend 150 minutes of cardiovascular activity a week, or 10,000 to 15,000 steps a day, 2 days of weightbearing  -Encourage healthy diet,avoid processed/refined carbohydrates   Health Maintenance Due   Topic Date Due    Hepatitis B vaccine (1 of 3 - 3-dose series) Never done    COVID-19 Vaccine (5 - 2023-24 season) 09/01/2023     - call insurance ask if annual has to be in 12 month , last 10/2023   - Call back (billing as a annual)    Prior to Visit Medications    Medication Sig Taking? Authorizing Provider   valsartan-hydroCHLOROthiazide (DIOVAN-HCT) 80-12.5 MG per tablet TAKE 1 TABLET BY MOUTH DAILY Yes Shellie Hernandez, DO   butalbital-acetaminophen-caffeine (FIORICET, ESGIC) -40 MG per tablet Take 1 tablet by mouth every 4 hours as needed for Headaches Yes Shellie Hernandez, DO   ketoconazole (NIZORAL) 2 % shampoo WASH EXTERNALLY TO THE SCALP 3 TIMES EVERY WEEK

## 2024-09-29 ENCOUNTER — PATIENT MESSAGE (OUTPATIENT)
Dept: FAMILY MEDICINE CLINIC | Age: 59
End: 2024-09-29

## 2024-09-29 DIAGNOSIS — I10 ESSENTIAL HYPERTENSION: ICD-10-CM

## 2024-09-30 RX ORDER — VALSARTAN AND HYDROCHLOROTHIAZIDE 80; 12.5 MG/1; MG/1
TABLET, FILM COATED ORAL
Qty: 90 TABLET | Refills: 3 | Status: SHIPPED | OUTPATIENT
Start: 2024-09-30

## 2024-09-30 NOTE — TELEPHONE ENCOUNTER
Future Appointments   Date Time Provider Department Center   10/21/2024  9:00 AM Jamil Coburn MD [facility]             LOV 7/25/2024

## 2024-10-15 DIAGNOSIS — Z13.1 DIABETES MELLITUS SCREENING: ICD-10-CM

## 2024-10-15 DIAGNOSIS — Z13.220 SCREENING FOR LIPID DISORDERS: ICD-10-CM

## 2024-10-15 DIAGNOSIS — I10 ESSENTIAL HYPERTENSION: ICD-10-CM

## 2024-10-15 LAB
ALBUMIN SERPL-MCNC: 4.3 G/DL (ref 3.4–5)
ALBUMIN/GLOB SERPL: 2.4 {RATIO} (ref 1.1–2.2)
ALP SERPL-CCNC: 57 U/L (ref 40–129)
ALT SERPL-CCNC: 18 U/L (ref 10–40)
ANION GAP SERPL CALCULATED.3IONS-SCNC: 11 MMOL/L (ref 3–16)
AST SERPL-CCNC: 17 U/L (ref 15–37)
BASOPHILS # BLD: 0 K/UL (ref 0–0.2)
BASOPHILS NFR BLD: 0.6 %
BILIRUB SERPL-MCNC: 0.4 MG/DL (ref 0–1)
BUN SERPL-MCNC: 16 MG/DL (ref 7–20)
CALCIUM SERPL-MCNC: 9.6 MG/DL (ref 8.3–10.6)
CHLORIDE SERPL-SCNC: 104 MMOL/L (ref 99–110)
CHOLEST SERPL-MCNC: 183 MG/DL (ref 0–199)
CO2 SERPL-SCNC: 27 MMOL/L (ref 21–32)
CREAT SERPL-MCNC: 0.6 MG/DL (ref 0.6–1.1)
DEPRECATED RDW RBC AUTO: 13.6 % (ref 12.4–15.4)
EOSINOPHIL # BLD: 0.3 K/UL (ref 0–0.6)
EOSINOPHIL NFR BLD: 6 %
EST. AVERAGE GLUCOSE BLD GHB EST-MCNC: 111.2 MG/DL
GFR SERPLBLD CREATININE-BSD FMLA CKD-EPI: >90 ML/MIN/{1.73_M2}
GLUCOSE SERPL-MCNC: 91 MG/DL (ref 70–99)
HBA1C MFR BLD: 5.5 %
HCT VFR BLD AUTO: 42.1 % (ref 36–48)
HDLC SERPL-MCNC: 46 MG/DL (ref 40–60)
HGB BLD-MCNC: 14.1 G/DL (ref 12–16)
LDL CHOLESTEROL: 105 MG/DL
LYMPHOCYTES # BLD: 1.9 K/UL (ref 1–5.1)
LYMPHOCYTES NFR BLD: 38.6 %
MCH RBC QN AUTO: 29.9 PG (ref 26–34)
MCHC RBC AUTO-ENTMCNC: 33.6 G/DL (ref 31–36)
MCV RBC AUTO: 88.9 FL (ref 80–100)
MONOCYTES # BLD: 0.3 K/UL (ref 0–1.3)
MONOCYTES NFR BLD: 6.1 %
NEUTROPHILS # BLD: 2.5 K/UL (ref 1.7–7.7)
NEUTROPHILS NFR BLD: 48.7 %
PLATELET # BLD AUTO: 268 K/UL (ref 135–450)
PMV BLD AUTO: 8.3 FL (ref 5–10.5)
POTASSIUM SERPL-SCNC: 4.1 MMOL/L (ref 3.5–5.1)
PROT SERPL-MCNC: 6.1 G/DL (ref 6.4–8.2)
RBC # BLD AUTO: 4.73 M/UL (ref 4–5.2)
SODIUM SERPL-SCNC: 142 MMOL/L (ref 136–145)
TRIGL SERPL-MCNC: 162 MG/DL (ref 0–150)
TSH SERPL DL<=0.005 MIU/L-ACNC: 0.83 UIU/ML (ref 0.27–4.2)
VLDLC SERPL CALC-MCNC: 32 MG/DL
WBC # BLD AUTO: 5 K/UL (ref 4–11)

## 2024-10-21 ENCOUNTER — OFFICE VISIT (OUTPATIENT)
Dept: FAMILY MEDICINE CLINIC | Age: 59
End: 2024-10-21
Payer: OTHER GOVERNMENT

## 2024-10-21 VITALS
DIASTOLIC BLOOD PRESSURE: 70 MMHG | SYSTOLIC BLOOD PRESSURE: 130 MMHG | HEART RATE: 77 BPM | BODY MASS INDEX: 35.11 KG/M2 | OXYGEN SATURATION: 97 % | RESPIRATION RATE: 16 BRPM | WEIGHT: 211 LBS | TEMPERATURE: 97.3 F

## 2024-10-21 DIAGNOSIS — Z86.018 HISTORY OF BENIGN OVARIAN TUMOR: ICD-10-CM

## 2024-10-21 DIAGNOSIS — G43.809 OTHER MIGRAINE WITHOUT STATUS MIGRAINOSUS, NOT INTRACTABLE: ICD-10-CM

## 2024-10-21 DIAGNOSIS — N81.10 FEMALE BLADDER PROLAPSE: ICD-10-CM

## 2024-10-21 DIAGNOSIS — L30.9 HAND DERMATITIS: ICD-10-CM

## 2024-10-21 DIAGNOSIS — Z23 INFLUENZA VACCINE NEEDED: ICD-10-CM

## 2024-10-21 DIAGNOSIS — I10 ESSENTIAL HYPERTENSION: Primary | ICD-10-CM

## 2024-10-21 PROCEDURE — 99396 PREV VISIT EST AGE 40-64: CPT | Performed by: STUDENT IN AN ORGANIZED HEALTH CARE EDUCATION/TRAINING PROGRAM

## 2024-10-21 PROCEDURE — 3075F SYST BP GE 130 - 139MM HG: CPT | Performed by: STUDENT IN AN ORGANIZED HEALTH CARE EDUCATION/TRAINING PROGRAM

## 2024-10-21 PROCEDURE — 3078F DIAST BP <80 MM HG: CPT | Performed by: STUDENT IN AN ORGANIZED HEALTH CARE EDUCATION/TRAINING PROGRAM

## 2024-10-21 PROCEDURE — 90661 CCIIV3 VAC ABX FR 0.5 ML IM: CPT | Performed by: STUDENT IN AN ORGANIZED HEALTH CARE EDUCATION/TRAINING PROGRAM

## 2024-10-21 PROCEDURE — 90471 IMMUNIZATION ADMIN: CPT | Performed by: STUDENT IN AN ORGANIZED HEALTH CARE EDUCATION/TRAINING PROGRAM

## 2024-10-21 RX ORDER — OXYBUTYNIN CHLORIDE 10 MG/1
10 TABLET, EXTENDED RELEASE ORAL
COMMUNITY
Start: 2024-10-09

## 2024-10-21 RX ORDER — FLUOCINONIDE 0.5 MG/G
OINTMENT TOPICAL
Qty: 30 G | Refills: 1 | Status: SHIPPED | OUTPATIENT
Start: 2024-10-21

## 2024-10-21 RX ORDER — BUTALBITAL, ACETAMINOPHEN AND CAFFEINE 50; 325; 40 MG/1; MG/1; MG/1
1 TABLET ORAL DAILY PRN
Qty: 30 TABLET | Refills: 0 | Status: SHIPPED | OUTPATIENT
Start: 2024-10-21

## 2024-10-21 SDOH — ECONOMIC STABILITY: FOOD INSECURITY: WITHIN THE PAST 12 MONTHS, THE FOOD YOU BOUGHT JUST DIDN'T LAST AND YOU DIDN'T HAVE MONEY TO GET MORE.: NEVER TRUE

## 2024-10-21 SDOH — ECONOMIC STABILITY: FOOD INSECURITY: WITHIN THE PAST 12 MONTHS, YOU WORRIED THAT YOUR FOOD WOULD RUN OUT BEFORE YOU GOT MONEY TO BUY MORE.: NEVER TRUE

## 2024-10-21 SDOH — ECONOMIC STABILITY: INCOME INSECURITY: HOW HARD IS IT FOR YOU TO PAY FOR THE VERY BASICS LIKE FOOD, HOUSING, MEDICAL CARE, AND HEATING?: NOT VERY HARD

## 2024-10-21 ASSESSMENT — PATIENT HEALTH QUESTIONNAIRE - PHQ9
SUM OF ALL RESPONSES TO PHQ QUESTIONS 1-9: 0
SUM OF ALL RESPONSES TO PHQ QUESTIONS 1-9: 0
2. FEELING DOWN, DEPRESSED OR HOPELESS: NOT AT ALL
SUM OF ALL RESPONSES TO PHQ9 QUESTIONS 1 & 2: 0
1. LITTLE INTEREST OR PLEASURE IN DOING THINGS: NOT AT ALL
SUM OF ALL RESPONSES TO PHQ QUESTIONS 1-9: 0
SUM OF ALL RESPONSES TO PHQ QUESTIONS 1-9: 0

## 2024-10-21 NOTE — PATIENT INSTRUCTIONS
. Annual physical exam    -Recommend 150 minutes of cardiovascular activity a week, or 10,000 to 15,000 steps a day, 2 days of weightbearing  -Encourage healthy diet,avoid processed/refined carbohydrates   Health Maintenance Due   Topic Date Due    Hepatitis B vaccine (1 of 3 - 19+ 3-dose series) Never done    Flu vaccine (1) 08/01/2024    COVID-19 Vaccine (5 - 2023-24 season) 09/01/2024   Total cholesterol - Goals below 200  LDL cholesterol - known as \"bad\" cholesterol - high LDL raises your risk of heart attack, stroke, and other health problems. Goal < 130  HDL cholesterol - known as \"good\" cholesterol, high HDL levels DECREASES risk of heart attack, stroke, and other health problems. Goal > 60   Triglycerides - not cholesterol, another type of fat. High triglycerides also seems to increase the risk of heart attack and stroke.)Goal < 150    A \"heart-healthy\" diet includes lots of fruits and vegetables, fiber, and healthy fats (like those found in fish, nuts, and certain oils). It also means limiting sugar and unhealthy fats.  You can lower your LDL, or \"bad,\" cholesterol by avoiding red meat, butter, fried foods, cheese, and other foods that have a lot of saturated fat.  You can lower triglycerides by avoiding sugary foods, fried foods, and excess alcohol.  If you are overweight, it can help to lose weight. Your doctor or nurse can help you do this in a healthy way.  Try to get regular physical activity. Even gentle forms of exercise, like walking, are good for your health.

## 2024-10-21 NOTE — PROGRESS NOTES
Barbra Robins  YOB: 1965    Date of Service:  10/21/2024    Chief Complaint:   Barbra Robins is a 59 y.o. female who presents  Concerns:   Chief Complaint   Patient presents with   • Annual Exam       HPI:  Patient is a 59 y.o. female  has a past medical history of COVID-19, Hypertension, Low back pain, Nausea & vomiting, and Ovarian tumor (benign). who presents to [facility] care.  Previously seen by Dr. Hernandez who is retired.  Last annual physical 10/13/2023.Father passed away in October 2023.  Completed labs 10/15/2024  Hemoglobin A1c 5.5  Lipid panel triglycerides 162  total cholesterol 183  TSH CBC and CMP normal    Bladder prolapse: Saw her OB/GYN , started on oxybutynin 10 mg daily. Sees Dr Yanes now.    Degenerative disc disease/close fracture of coccyx seen at [facility] back [facility] spine center. Endorses some fatigue and pain in coccyx. On ibuprofen or tylenol. Pain at night.    Hypertension: On valsartan hydrochlorothiazide 80-12.5 mg daily, blood pressure is well-controlled on current medication. No CP or SOB.   Migraine: Fioricet as needed 2 x a month. Has associated nausea.    Seborrheic dermatitis/psoriasis seen by dermatology Dr. Gustafson at [facility]. On ketoconazole shampoo and topical lidex.    Dermatitis: Takes Flucinonide 0.05 ointment as needed.     Recent illnesses/hospitalizations : none     Lives at home with    Diet - Salads, Vegetables, Almonds , limited meats - chicken and fish . Less diet pop.   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

## 2025-03-07 SDOH — HEALTH STABILITY: PHYSICAL HEALTH: ON AVERAGE, HOW MANY MINUTES DO YOU ENGAGE IN EXERCISE AT THIS LEVEL?: 30 MIN

## 2025-03-07 SDOH — HEALTH STABILITY: PHYSICAL HEALTH: ON AVERAGE, HOW MANY DAYS PER WEEK DO YOU ENGAGE IN MODERATE TO STRENUOUS EXERCISE (LIKE A BRISK WALK)?: 2 DAYS

## 2025-03-10 ENCOUNTER — OFFICE VISIT (OUTPATIENT)
Dept: ORTHOPEDIC SURGERY | Age: 60
End: 2025-03-10
Payer: OTHER GOVERNMENT

## 2025-03-10 VITALS — WEIGHT: 211 LBS | HEIGHT: 65 IN | BODY MASS INDEX: 35.16 KG/M2

## 2025-03-10 DIAGNOSIS — M25.562 PAIN OF PATELLOFEMORAL JOINT, LEFT: ICD-10-CM

## 2025-03-10 DIAGNOSIS — M25.562 LEFT KNEE PAIN, UNSPECIFIED CHRONICITY: Primary | ICD-10-CM

## 2025-03-10 DIAGNOSIS — M17.12 OSTEOARTHRITIS OF LEFT KNEE, UNSPECIFIED OSTEOARTHRITIS TYPE: ICD-10-CM

## 2025-03-10 PROCEDURE — 99214 OFFICE O/P EST MOD 30 MIN: CPT | Performed by: PHYSICIAN ASSISTANT

## 2025-03-10 RX ORDER — MELOXICAM 15 MG/1
15 TABLET ORAL DAILY PRN
Qty: 30 TABLET | Refills: 0 | Status: SHIPPED | OUTPATIENT
Start: 2025-03-10

## 2025-03-10 NOTE — PROGRESS NOTES
Chief Complaint    Knee Pain (Left )      History of Present Illness:  Barbra Robins is a 59 y.o. female who presents today for evaluation of left lateral knee pain.  Patient states that she has been experiencing left lateral knee pain for approximately 5 months.  She states that the pain hovers around a 6/10 and is increased with pivoting and twisting.  She has had intermittent swelling.  She does not remember any 1 specific injury to precipitate her pain.  She finds that ascending descending steps increases her pain and she does feel a sense of crepitus over the lateral aspect of the knee.  She denies any locking or catching but does have intermittent giving way.  She has been taking ibuprofen and Tylenol with some benefit.  Pain Assessment  Location of Pain: Knee  Location Modifiers: Left  Severity of Pain: 6  Quality of Pain: Other (Comment)  Duration of Pain: Other (Comment)  Frequency of Pain: Other (Comment)  Aggravating Factors: Other (Comment)]       Medical History:  Patient's medications, allergies, past medical, surgical, social and family histories were reviewed and updated as appropriate.    Review of Systems:  Relevant review of systems reviewed and available in the patient's chart    Vital Signs:  There were no vitals filed for this visit.    General Exam:   Constitutional: Patient is adequately groomed with no evidence of malnutrition  DTRs: Deep tendon reflexes are intact  Mental Status: The patient is oriented to time, place and person.  The patient's mood and affect are appropriate.  Lymphatic: The lymphatic examination bilaterally reveals all areas to be without enlargement or induration.  Vascular: Examination reveals no swelling or calf tenderness.  Peripheral pulses are palpable and 2+.  Neurological: The patient has good coordination.  There is no weakness or sensory deficit.    Body mass index is 35.11 kg/m².    Left knee Examination:    Inspection:  No erythema or signs of infection.

## 2025-03-12 ENCOUNTER — HOSPITAL ENCOUNTER (OUTPATIENT)
Dept: WOMENS IMAGING | Age: 60
Discharge: HOME | End: 2025-03-12
Payer: OTHER GOVERNMENT

## 2025-03-12 VITALS — BODY MASS INDEX: 34.99 KG/M2 | WEIGHT: 210 LBS | HEIGHT: 65 IN

## 2025-03-12 DIAGNOSIS — Z12.31 SCREENING MAMMOGRAM FOR BREAST CANCER: ICD-10-CM

## 2025-03-12 PROCEDURE — 77063 BREAST TOMOSYNTHESIS BI: CPT

## 2025-03-13 ENCOUNTER — RESULTS FOLLOW-UP (OUTPATIENT)
Dept: WOMENS IMAGING | Age: 60
End: 2025-03-13

## 2025-04-03 ENCOUNTER — OFFICE VISIT (OUTPATIENT)
Dept: OBGYN CLINIC | Age: 60
End: 2025-04-03
Payer: OTHER GOVERNMENT

## 2025-04-03 VITALS
DIASTOLIC BLOOD PRESSURE: 83 MMHG | SYSTOLIC BLOOD PRESSURE: 128 MMHG | BODY MASS INDEX: 36.74 KG/M2 | HEART RATE: 73 BPM | WEIGHT: 220.8 LBS

## 2025-04-03 DIAGNOSIS — I10 ESSENTIAL HYPERTENSION: ICD-10-CM

## 2025-04-03 DIAGNOSIS — N39.46 MIXED STRESS AND URGE URINARY INCONTINENCE: ICD-10-CM

## 2025-04-03 DIAGNOSIS — Z90.710 HISTORY OF TOTAL VAGINAL HYSTERECTOMY: ICD-10-CM

## 2025-04-03 DIAGNOSIS — Z87.19 HISTORY OF COLONIC DIVERTICULITIS: ICD-10-CM

## 2025-04-03 DIAGNOSIS — N95.2 VAGINAL ATROPHY: ICD-10-CM

## 2025-04-03 DIAGNOSIS — N81.11 PROLAPSE OF VAGINAL WALL WITH MIDLINE CYSTOCELE: Primary | ICD-10-CM

## 2025-04-03 DIAGNOSIS — Z90.79 HISTORY OF BILATERAL SALPINGO-OOPHORECTOMY: ICD-10-CM

## 2025-04-03 DIAGNOSIS — Z90.722 HISTORY OF BILATERAL SALPINGO-OOPHORECTOMY: ICD-10-CM

## 2025-04-03 PROCEDURE — 3079F DIAST BP 80-89 MM HG: CPT | Performed by: OBSTETRICS & GYNECOLOGY

## 2025-04-03 PROCEDURE — 3074F SYST BP LT 130 MM HG: CPT | Performed by: OBSTETRICS & GYNECOLOGY

## 2025-04-03 PROCEDURE — 99203 OFFICE O/P NEW LOW 30 MIN: CPT | Performed by: OBSTETRICS & GYNECOLOGY

## 2025-04-08 RX ORDER — MELOXICAM 15 MG/1
15 TABLET ORAL DAILY PRN
Qty: 30 TABLET | Refills: 0 | Status: SHIPPED | OUTPATIENT
Start: 2025-04-08

## 2025-04-14 PROBLEM — Z90.710 HISTORY OF TOTAL VAGINAL HYSTERECTOMY: Status: ACTIVE | Noted: 2025-04-14

## 2025-04-14 PROBLEM — Z90.722 HISTORY OF BILATERAL SALPINGO-OOPHORECTOMY: Status: ACTIVE | Noted: 2025-04-14

## 2025-04-14 PROBLEM — Z90.79 HISTORY OF BILATERAL SALPINGO-OOPHORECTOMY: Status: ACTIVE | Noted: 2025-04-14

## 2025-04-14 PROBLEM — N39.46 MIXED STRESS AND URGE URINARY INCONTINENCE: Status: ACTIVE | Noted: 2025-04-14

## 2025-04-14 PROBLEM — N95.2 VAGINAL ATROPHY: Status: ACTIVE | Noted: 2025-04-14

## 2025-04-14 ASSESSMENT — ENCOUNTER SYMPTOMS
VOMITING: 0
ABDOMINAL PAIN: 0
CONSTIPATION: 0
NAUSEA: 0
DIARRHEA: 0
ABDOMINAL DISTENTION: 0
SHORTNESS OF BREATH: 0

## 2025-04-14 NOTE — PROGRESS NOTES
Subjective   Patient ID: Barbra Robins is a 59 y.o. female.    HPI  58 y/o  female presents for new patient visit to establish care.  Has noted issues with urinary leakage and pain with intercourse. Recently seen by Dr. Orourke at [facility]--discussed urinary symptoms and prolapse--midline cystocele.  Was placed on medication (oxybutynin-XL 10 mg)` for incontinence.  Experienced side effects--dryness and kidney pain.  Urodynamics planned to determine need for TVT.     Has noted a decrease in incontinence over time.    Admits to some pain after sex.  Has noted some bleeding (postcoital only).   Sexually active, monogamous x 30 years.  Active 3 x per week.     Last pap smear was 6 months ago per patient (?pelvic examination without pap smear?). Per old records last pap smear was 23--normal.  History is positive for abnormal pap smear 7/15/09--ASCUS, positive HPV.  Pap smear every couple years since  has been normal.   Denies vaginal bleeding.  History is positive for 'both types of hysterectomy\".  Underwent total vaginal hysterectomy in  followed by exploratory laparotomy, lysis of adhesions, BSO  in  for benign tumor.  Surgeries were performed by Dr. Torres at [facility]. Underwent lumpectomy in .   Family history is positive for mother with breast cancer--estrogen receptor positive.      Mammogram: 3/13/25--BI-RADS 1 Negative  Medical history is positive for hypertension, diverticulosis, and history of renal lithiasis    Review of Systems   Constitutional:  Negative for activity change, appetite change, chills, fatigue, fever and unexpected weight change.   Respiratory:  Negative for shortness of breath.    Cardiovascular:  Negative for chest pain.   Gastrointestinal:  Negative for abdominal distention, abdominal pain, constipation, diarrhea, nausea and vomiting.   Endocrine: Negative for cold intolerance and heat intolerance.   Genitourinary:  Positive for dyspareunia. Negative for difficulty

## 2025-04-22 ENCOUNTER — HOSPITAL ENCOUNTER (OUTPATIENT)
Dept: PHYSICAL THERAPY | Age: 60
Setting detail: THERAPIES SERIES
Discharge: HOME | End: 2025-04-22
Payer: OTHER GOVERNMENT

## 2025-04-22 DIAGNOSIS — N81.89 PELVIC FLOOR WEAKNESS: Primary | ICD-10-CM

## 2025-04-22 DIAGNOSIS — R19.8 ABDOMINAL WEAKNESS: ICD-10-CM

## 2025-04-22 PROCEDURE — 97530 THERAPEUTIC ACTIVITIES: CPT

## 2025-04-22 PROCEDURE — 97161 PT EVAL LOW COMPLEX 20 MIN: CPT

## 2025-04-22 NOTE — PLAN OF CARE
[facility] - Outpatient Rehabilitation and Therapy: [address] [place] [address] office: [phone] fax: [phone]     Physical Therapy Initial Evaluation Certification      Dear Blossom Angulo DO,    We had the pleasure of evaluating the following patient for physical therapy services at [facility].  A summary of our findings can be found in the initial assessment below.  This includes our plan of care.  If you have any questions or concerns regarding these findings, please do not hesitate to contact me at the office phone number listed above.  Thank you for the referral.     Physician Signature:_______________________________Date:__________________  By signing above (or electronic signature), therapist’s plan is approved by physician       Physical Therapy: TREATMENT/PROGRESS NOTE   Patient: Barbra Robins (59 y.o. female)   Examination Date: 2025   :  1965 MRN: [id number]   Visit #: 1   Insurance Allowable Auth Needed   [place] []Yes    [x]No    Insurance: Payor:  EAST / Plan:  EAST SELECT / Product Type: *No Product type* /   Insurance ID: [id number] - (Other)  Secondary Insurance (if applicable):    Treatment Diagnosis:     ICD-10-CM    1. Pelvic floor weakness  N81.89       2. Abdominal weakness  R19.8          Medical Diagnosis:  Prolapse of vaginal wall with midline cystocele [N81.11]  Mixed stress and urge urinary incontinence [N39.46]   Referring Physician: Blossom Angulo, *  PCP: Jamil Coburn MD   Plan of care signed (Y/N): N    Date of Patient follow up with Physician:      Plan of Care Report: EVAL today  POC update due: (10 visits /OR AUTH LIMITS, whichever is less)  2025                                             Medical History:  Comorbidities:  Hypertension  COVID-19  Relevant Medical History: see chart, updated                                         Precautions/ Contra-indications:

## 2025-04-29 ENCOUNTER — HOSPITAL ENCOUNTER (OUTPATIENT)
Dept: PHYSICAL THERAPY | Age: 60
Setting detail: THERAPIES SERIES
Discharge: HOME | End: 2025-04-29
Payer: OTHER GOVERNMENT

## 2025-04-29 PROCEDURE — 97530 THERAPEUTIC ACTIVITIES: CPT

## 2025-04-29 PROCEDURE — 97110 THERAPEUTIC EXERCISES: CPT

## 2025-04-29 NOTE — FLOWSHEET NOTE
[facility] - Outpatient Rehabilitation and Therapy: [address] [place] [address] office: [phone] fax: [phone]       Physical Therapy: TREATMENT/PROGRESS NOTE   Patient: Barbra Robins (59 y.o. female)   Examination Date: 2025   :  1965 MRN: [id number]   Visit #: 2   Insurance Allowable Auth Needed   MN []Yes    [x]No    Insurance: Payor:  EAST / Plan:  EAST SELECT / Product Type: *No Product type* /   Insurance ID: [id number] - (Other)  Secondary Insurance (if applicable):    Treatment Diagnosis:     ICD-10-CM    1. Pelvic floor weakness  N81.89       2. Abdominal weakness  R19.8          Medical Diagnosis:  Prolapse of vaginal wall with midline cystocele [N81.11]  Mixed stress and urge urinary incontinence [N39.46]   Referring Physician: Blossom Angulo, *  PCP: Jamil Coburn MD   Plan of care signed (Y/N): N    Date of Patient follow up with Physician:      Plan of Care Report: NO  POC update due: (10 visits /OR AUTH LIMITS, whichever is less)  2025                                             Medical History:  Comorbidities:  Hypertension  COVID-19  Relevant Medical History: see chart, updated                                         Precautions/ Contra-indications:           Latex allergy:  NO  Pacemaker:    NO  Contraindications for Manipulation: NA    Red Flags:  None    Suicide Screening:   The patient did not verbalize a primary behavioral concern, suicidal ideation, suicidal intent, or demonstrate suicidal behaviors.    Preferred Language for Healthcare:   [x] English       [] other:    SUBJECTIVE EXAMINATION     Patient stated complaint/comments: Feel that is was hard to do the long hold kegals. Able to be more active the past week. Denies any leaks while mowing grass.     From Eval 25: Patient reports that over the past few years it has been getting worse, then over the past 6 months. Has had a lot of changes in providers,

## 2025-05-07 ENCOUNTER — HOSPITAL ENCOUNTER (OUTPATIENT)
Dept: PHYSICAL THERAPY | Age: 60
Setting detail: THERAPIES SERIES
Discharge: HOME | End: 2025-05-07
Payer: OTHER GOVERNMENT

## 2025-05-07 PROCEDURE — 97110 THERAPEUTIC EXERCISES: CPT

## 2025-05-07 PROCEDURE — 97530 THERAPEUTIC ACTIVITIES: CPT

## 2025-05-07 NOTE — FLOWSHEET NOTE
[facility] - Outpatient Rehabilitation and Therapy: [address] [place] [address] office: [phone] fax: [phone]       Physical Therapy: TREATMENT/PROGRESS NOTE   Patient: Barbra Robins (59 y.o. female)   Examination Date: 2025   :  1965 MRN: [id number]   Visit #: 3   Insurance Allowable Auth Needed   MN []Yes    [x]No    Insurance: Payor:  EAST / Plan:  EAST SELECT / Product Type: *No Product type* /   Insurance ID: [id number] - (Other)  Secondary Insurance (if applicable):    Treatment Diagnosis:     ICD-10-CM    1. Pelvic floor weakness  N81.89       2. Abdominal weakness  R19.8          Medical Diagnosis:  Prolapse of vaginal wall with midline cystocele [N81.11]  Mixed stress and urge urinary incontinence [N39.46]   Referring Physician: Blossom Angulo, *  PCP: Jamil Coburn MD   Plan of care signed (Y/N): Y    Date of Patient follow up with Physician: PRN     Plan of Care Report: NO  POC update due: (10 visits /OR AUTH LIMITS, whichever is less)  2025                                             Medical History:  Comorbidities:  Hypertension  COVID-19  Relevant Medical History: see chart, updated                                         Precautions/ Contra-indications:           Latex allergy:  NO  Pacemaker:    NO  Contraindications for Manipulation: NA    Red Flags:  None    Suicide Screening:   The patient did not verbalize a primary behavioral concern, suicidal ideation, suicidal intent, or demonstrate suicidal behaviors.    Preferred Language for Healthcare:   [x] English       [] other:    SUBJECTIVE EXAMINATION     Patient stated complaint/comments: States that exercises are going well. Has been working on drinking more water. Some pressure in coccyx. Reports no pain in the past week. Only 1x leaking one time in the past week, from waiting too long to use the bathroom. Reports feeling less heaviness at the end of the day, only

## 2025-05-09 RX ORDER — MELOXICAM 15 MG/1
15 TABLET ORAL DAILY PRN
Qty: 30 TABLET | Refills: 0 | Status: SHIPPED | OUTPATIENT
Start: 2025-05-09

## 2025-05-12 ENCOUNTER — TELEPHONE (OUTPATIENT)
Dept: OBGYN CLINIC | Age: 60
End: 2025-05-12

## 2025-05-12 NOTE — TELEPHONE ENCOUNTER
Submitted PA for Intrarosa 6.5MG inserts  Via [facility] [id number] STATUS: PENDING.    Follow up done daily; if no decision with in three days we will refax.  If another three days goes by with no decision will call the insurance for status.

## 2025-05-13 NOTE — TELEPHONE ENCOUNTER
The medication Intrarosa 6.5MG inserts is APPROVED from 4/12/2025  to 5/12/2026 .    Please notify the patient.    If this requires a response please respond to the pool ( P MHCX PSC MEDICATION PRE-AUTH).

## 2025-05-21 ENCOUNTER — HOSPITAL ENCOUNTER (OUTPATIENT)
Dept: PHYSICAL THERAPY | Age: 60
Setting detail: THERAPIES SERIES
Discharge: HOME | End: 2025-05-21
Payer: OTHER GOVERNMENT

## 2025-05-21 PROCEDURE — 97110 THERAPEUTIC EXERCISES: CPT

## 2025-05-21 NOTE — FLOWSHEET NOTE
session    Education/Home Exercise Program: Patient HEP program created electronically.  Refer to [employer] access code: [email or web address]      ASSESSMENT   Today's Assessment: Patient had good tolerance to today's session, reporting challenge with program completed. Able to progress  exercise difficulty on bridging and resistance with pelvic floor strengthening. Continues to display deficits in weakness which required ongoing skilled physical therapy and decision making.     Medical Necessity Documentation:  I certify that this patient meets the below criteria necessary for medical necessity for care and/or justification of therapy services:  The patient has functional impairments and/or activity limitations and would benefit from continued outpatient therapy services to address the deficits outlined in the patients goals  The patient has a musculoskeletal condition(s) with a corresponding ICD-10 code that is of complexity and severity that require skilled therapeutic intervention. This has a direct and significant impact on the need for therapy and significantly impacts the rate of recovery.     Tolerance of evaluation/treatment: Good    Prognosis for POC: [x] Good [] Fair  [] Poor    Patient requires continued skilled intervention: [x] Yes  [] No      CHARGE CAPTURE     PT CHARGE GRID   CPT Code (TIMED) minutes # CPT Code (UNTIMED) #     Therex (14577)  38 3  EVAL:LOW (18504 - Typically 20 minutes face-to-face)     Neuromusc. Re-ed (34268)    Re-Eval (21531)     Manual (80518)    Estim Unattended (09310)     Ther. Act (02501)    Mech. Traction (40622)     Gait (95997)    Dry Needle 1-2 muscle (86754)     Aquatic Therex (10027)    Dry Needle 3+ muscle (20561)     Iontophoresis (92732)    VASO (82109)     Ultrasound (78421)    Group Therapy (71506)     Estim Attended (39672)    Canalith Repositioning (90713)     Physical Performance Test (16426)    Custom orthotic ()     Other:    Other:    Total Timed Code Tx Minutes

## 2025-05-28 ENCOUNTER — HOSPITAL ENCOUNTER (OUTPATIENT)
Dept: PHYSICAL THERAPY | Age: 60
Setting detail: THERAPIES SERIES
Discharge: HOME | End: 2025-05-28
Payer: OTHER GOVERNMENT

## 2025-05-28 PROCEDURE — 97110 THERAPEUTIC EXERCISES: CPT

## 2025-05-28 NOTE — FLOWSHEET NOTE
pOther:     Modalities:    No modalities applied this session    Education/Home Exercise Program: Patient HEP program created electronically.  Refer to [employer] access code: [email or web address]      ASSESSMENT   Today's Assessment: Patient had good tolerance to today's session, reporting appropriate fatigue with program completed. Maintained current level of  exercise difficulty on pelvic floor strengthening d/t symptoms worsening over the weekend when lifting. Continues to display deficits in weakness and decreased dynamic stabilty which required ongoing skilled physical therapy and decision making.  and plan to perform re-assessment NPV.     Medical Necessity Documentation:  I certify that this patient meets the below criteria necessary for medical necessity for care and/or justification of therapy services:  The patient has functional impairments and/or activity limitations and would benefit from continued outpatient therapy services to address the deficits outlined in the patients goals  The patient has a musculoskeletal condition(s) with a corresponding ICD-10 code that is of complexity and severity that require skilled therapeutic intervention. This has a direct and significant impact on the need for therapy and significantly impacts the rate of recovery.     Tolerance of evaluation/treatment: Good    Prognosis for POC: [x] Good [] Fair  [] Poor    Patient requires continued skilled intervention: [x] Yes  [] No      CHARGE CAPTURE     PT CHARGE GRID   CPT Code (TIMED) minutes # CPT Code (UNTIMED) #     Therex (86784)  38 3  EVAL:LOW (76343 - Typically 20 minutes face-to-face)     Neuromusc. Re-ed (36410)    Re-Eval (54315)     Manual (85632)    Estim Unattended (54946)     Ther. Act (48626)    Mech. Traction (93351)     Gait (50362)    Dry Needle 1-2 muscle (20121)     Aquatic Therex (51271)    Dry Needle 3+ muscle (20561)     Iontophoresis (23521)    VASO (16787)     Ultrasound (64978)    Group Therapy (83506)

## 2025-06-11 ENCOUNTER — HOSPITAL ENCOUNTER (OUTPATIENT)
Dept: PHYSICAL THERAPY | Age: 60
Setting detail: THERAPIES SERIES
Discharge: HOME | End: 2025-06-11
Payer: OTHER GOVERNMENT

## 2025-06-11 PROCEDURE — 97140 MANUAL THERAPY 1/> REGIONS: CPT

## 2025-06-11 PROCEDURE — 97530 THERAPEUTIC ACTIVITIES: CPT

## 2025-06-11 NOTE — FLOWSHEET NOTE
[facility] - Outpatient Rehabilitation and Therapy: [address] [place] [address] office: [phone] fax: [phone]       Physical Therapy: TREATMENT/PROGRESS NOTE   Patient: Barbra Robins (59 y.o. female)   Examination Date: 2025   :  1965 MRN: [id number]   Visit #: 6   Insurance Allowable Auth Needed   MN []Yes    [x]No    Insurance: Payor:  EAST / Plan:  EAST SELECT / Product Type: *No Product type* /   Insurance ID: [id number] - (Other)  Secondary Insurance (if applicable):    Treatment Diagnosis:     ICD-10-CM    1. Pelvic floor weakness  N81.89       2. Abdominal weakness  R19.8          Medical Diagnosis:  Prolapse of vaginal wall with midline cystocele [N81.11]  Mixed stress and urge urinary incontinence [N39.46]   Referring Physician: Blossom Angulo, *  PCP: Jamil Coburn MD   Plan of care signed (Y/N): Y    Date of Patient follow up with Physician: PRN     Plan of Care Report: YES, Date Range for this report: 25 to 25  POC update due: (10 visits /OR AUTH LIMITS, whichever is less)  2025                                             Medical History:  Comorbidities:  Hypertension  COVID-19  Relevant Medical History: see chart, updated                                         Precautions/ Contra-indications:           Latex allergy:  NO  Pacemaker:    NO  Contraindications for Manipulation: NA    Red Flags:  None    Suicide Screening:   The patient did not verbalize a primary behavioral concern, suicidal ideation, suicidal intent, or demonstrate suicidal behaviors.    Preferred Language for Healthcare:   [x] English       [] other:    SUBJECTIVE EXAMINATION     Patient stated complaint/comments: Reports things are going better. \"I feel I can hold better and do more\". Able to complete yard work at home without issues. Did receive non-hormonal estrogen cream, and started using it. Not feeling as much heaviness, sometimes at the

## 2025-06-24 DIAGNOSIS — I10 ESSENTIAL HYPERTENSION: ICD-10-CM

## 2025-06-24 NOTE — TELEPHONE ENCOUNTER
LOV 10/21/2024    Future Appointments   Date Time Provider Department Center   6/25/2025  9:00 AM Aleta Love PT [facility]   10/22/2025  9:20 AM Jamil Coburn MD [facility]

## 2025-06-25 ENCOUNTER — HOSPITAL ENCOUNTER (OUTPATIENT)
Dept: PHYSICAL THERAPY | Age: 60
Setting detail: THERAPIES SERIES
Discharge: HOME | End: 2025-06-25
Payer: OTHER GOVERNMENT

## 2025-06-25 PROCEDURE — 97110 THERAPEUTIC EXERCISES: CPT

## 2025-06-25 RX ORDER — VALSARTAN AND HYDROCHLOROTHIAZIDE 80; 12.5 MG/1; MG/1
1 TABLET, FILM COATED ORAL DAILY
Qty: 90 TABLET | Refills: 1 | Status: SHIPPED | OUTPATIENT
Start: 2025-06-25

## 2025-06-25 NOTE — FLOWSHEET NOTE
[facility] - Outpatient Rehabilitation and Therapy: [address] [place] [address] office: [phone] fax: [phone]       Physical Therapy: TREATMENT/PROGRESS NOTE   Patient: Barbra Robins (59 y.o. female)   Examination Date: 2025   :  1965 MRN: [id number]   Visit #: 7   Insurance Allowable Auth Needed   MN []Yes    [x]No    Insurance: Payor:  EAST / Plan:  EAST SELECT / Product Type: *No Product type* /   Insurance ID: [id number] - (Other)  Secondary Insurance (if applicable):    Treatment Diagnosis:     ICD-10-CM    1. Pelvic floor weakness  N81.89       2. Abdominal weakness  R19.8          Medical Diagnosis:  Prolapse of vaginal wall with midline cystocele [N81.11]  Mixed stress and urge urinary incontinence [N39.46]   Referring Physician: Blossom Angulo, *  PCP: Jamil Coburn MD   Plan of care signed (Y/N): Y    Date of Patient follow up with Physician: PRN     Plan of Care Report: YES, Date Range for this report: 25 to 25  POC update due: (10 visits /OR AUTH LIMITS, whichever is less)  2025                                             Medical History:  Comorbidities:  Hypertension  COVID-19  Relevant Medical History: see chart, updated                                         Precautions/ Contra-indications:           Latex allergy:  NO  Pacemaker:    NO  Contraindications for Manipulation: NA    Red Flags:  None    Suicide Screening:   The patient did not verbalize a primary behavioral concern, suicidal ideation, suicidal intent, or demonstrate suicidal behaviors.    Preferred Language for Healthcare:   [x] English       [] other:    SUBJECTIVE EXAMINATION     Patient stated complaint/comments: Doing well, had some heaviness again when doing yard work. Learning to take more breaks. Able to do more active cleaning without leaking. States that the non-hormonal estrogen cream is very difficult to rub in - feels more like wax

## 2025-08-05 ENCOUNTER — HOSPITAL ENCOUNTER (OUTPATIENT)
Dept: PHYSICAL THERAPY | Age: 60
Setting detail: THERAPIES SERIES
Discharge: HOME | End: 2025-08-05
Payer: OTHER GOVERNMENT

## 2025-08-05 PROCEDURE — 97530 THERAPEUTIC ACTIVITIES: CPT

## 2025-08-05 PROCEDURE — 97140 MANUAL THERAPY 1/> REGIONS: CPT
